# Patient Record
Sex: MALE | Race: WHITE | NOT HISPANIC OR LATINO | Employment: OTHER | URBAN - METROPOLITAN AREA
[De-identification: names, ages, dates, MRNs, and addresses within clinical notes are randomized per-mention and may not be internally consistent; named-entity substitution may affect disease eponyms.]

---

## 2017-05-26 ENCOUNTER — TRANSCRIBE ORDERS (OUTPATIENT)
Dept: ADMINISTRATIVE | Facility: HOSPITAL | Age: 57
End: 2017-05-26

## 2017-05-26 DIAGNOSIS — D48.5 NEOPLASM OF UNCERTAIN BEHAVIOR OF SKIN: Primary | ICD-10-CM

## 2017-05-30 ENCOUNTER — TRANSCRIBE ORDERS (OUTPATIENT)
Dept: ADMINISTRATIVE | Facility: HOSPITAL | Age: 57
End: 2017-05-30

## 2017-05-30 ENCOUNTER — HOSPITAL ENCOUNTER (OUTPATIENT)
Dept: MRI IMAGING | Facility: HOSPITAL | Age: 57
Discharge: HOME | End: 2017-05-30

## 2017-05-30 ENCOUNTER — HOSPITAL ENCOUNTER (OUTPATIENT)
Dept: CT IMAGING | Facility: HOSPITAL | Age: 57
Discharge: HOME | End: 2017-05-30
Admitting: DERMATOLOGY

## 2017-05-30 DIAGNOSIS — D48.5 NEOPLASM OF UNCERTAIN BEHAVIOR OF SKIN: Primary | ICD-10-CM

## 2017-05-30 DIAGNOSIS — R22.2 SWELLING, MASS, OR LUMP IN CHEST: ICD-10-CM

## 2017-05-30 DIAGNOSIS — D48.5 NEOPLASM OF UNCERTAIN BEHAVIOR OF SKIN: ICD-10-CM

## 2017-05-30 LAB — CREAT BLDA-MCNC: 0.9 MG/DL (ref 0.6–1.3)

## 2017-05-30 PROCEDURE — 74170 CT ABD WO CNTRST FLWD CNTRST: CPT

## 2017-05-30 PROCEDURE — 0 IOPAMIDOL 61 % SOLUTION: Performed by: DERMATOLOGY

## 2017-05-30 PROCEDURE — 82565 ASSAY OF CREATININE: CPT

## 2017-05-30 RX ADMIN — IOPAMIDOL 80 ML: 612 INJECTION, SOLUTION INTRAVENOUS at 17:45

## 2017-06-10 ENCOUNTER — APPOINTMENT (OUTPATIENT)
Dept: GENERAL RADIOLOGY | Facility: HOSPITAL | Age: 57
End: 2017-06-10

## 2017-06-10 ENCOUNTER — HOSPITAL ENCOUNTER (EMERGENCY)
Facility: HOSPITAL | Age: 57
Discharge: HOME | End: 2017-06-10
Attending: EMERGENCY MEDICINE | Admitting: EMERGENCY MEDICINE

## 2017-06-10 VITALS
WEIGHT: 295 LBS | OXYGEN SATURATION: 94 % | HEIGHT: 78 IN | TEMPERATURE: 98.4 F | HEART RATE: 79 BPM | RESPIRATION RATE: 18 BRPM | BODY MASS INDEX: 34.13 KG/M2 | DIASTOLIC BLOOD PRESSURE: 90 MMHG | SYSTOLIC BLOOD PRESSURE: 121 MMHG

## 2017-06-10 DIAGNOSIS — S82.832A OTHER CLOSED FRACTURE OF PROXIMAL END OF LEFT FIBULA, INITIAL ENCOUNTER: Primary | ICD-10-CM

## 2017-06-10 DIAGNOSIS — S40.011A CONTUSION OF RIGHT SHOULDER, INITIAL ENCOUNTER: ICD-10-CM

## 2017-06-10 PROCEDURE — 99284 EMERGENCY DEPT VISIT MOD MDM: CPT

## 2017-06-10 PROCEDURE — 73030 X-RAY EXAM OF SHOULDER: CPT

## 2017-06-10 PROCEDURE — 73590 X-RAY EXAM OF LOWER LEG: CPT

## 2017-06-10 RX ORDER — IBUPROFEN 600 MG/1
600 TABLET ORAL EVERY 6 HOURS PRN
COMMUNITY
End: 2022-10-14

## 2017-06-10 RX ORDER — HYDROCODONE BITARTRATE AND ACETAMINOPHEN 7.5; 325 MG/1; MG/1
1 TABLET ORAL ONCE
Status: COMPLETED | OUTPATIENT
Start: 2017-06-10 | End: 2017-06-10

## 2017-06-10 RX ORDER — HYDROCODONE BITARTRATE AND ACETAMINOPHEN 5; 325 MG/1; MG/1
1 TABLET ORAL EVERY 6 HOURS PRN
Qty: 15 TABLET | Refills: 0 | Status: SHIPPED | OUTPATIENT
Start: 2017-06-10 | End: 2022-10-14

## 2017-06-10 RX ADMIN — HYDROCODONE BITARTRATE AND ACETAMINOPHEN 1 TABLET: 7.5; 325 TABLET ORAL at 11:46

## 2017-06-10 NOTE — ED PROVIDER NOTES
Subjective   HPI Comments: Brian Bryan Jr. is a 57 y.o.male who presents to the ED with c/o left shoulder pain. The patient reports he was holding his daughters horse for her this morning, when the horse suddenly got scared and reared backwards toward the patient. The patient reports he dropped to his knees and leaned backwards to avoid the horse, however he reacted too late, and was knocked by the horse in his left shoulder and fell to the ground. He presents into the ED for evaluation. The patient c/o bilateral lower leg pain below his knees and right shoulder pain, but denies chest pain, neck pain, abdominal pain, SOA, numbness/tingling, weakness in extremities, or any other acute complaints at this time.    Patient is a 57 y.o. male presenting with upper extremity pain.   History provided by:  Patient  Upper Extremity Issue   Location:  Shoulder  Shoulder location:  R shoulder  Injury: yes    Time since incident: just PTA.  Mechanism of injury comment:  Horse accidentally knocked down the patient  Pain details:     Quality:  Aching    Radiates to:  Does not radiate    Severity:  Moderate    Onset quality:  Sudden    Timing:  Constant    Progression:  Unchanged  Foreign body present:  No foreign bodies  Prior injury to area:  No  Relieved by:  None tried  Worsened by:  Movement  Ineffective treatments:  None tried  Associated symptoms: no back pain, no fever, no neck pain, no numbness and no tingling    Risk factors: no frequent fractures        Review of Systems   Constitutional: Negative for fever.   Respiratory: Negative for shortness of breath.    Cardiovascular: Negative for chest pain.   Gastrointestinal: Negative for abdominal pain, nausea and vomiting.   Musculoskeletal: Negative for back pain and neck pain.        Right shoulder pain. Bilateral lower leg pain.   Neurological: Negative for weakness and numbness.   All other systems reviewed and are negative.      History reviewed. No pertinent past  medical history.    No Known Allergies    Past Surgical History:   Procedure Laterality Date   • HERNIA REPAIR     • TOTAL HIP ARTHROPLASTY Left        History reviewed. No pertinent family history.    Social History     Social History   • Marital status:      Spouse name: N/A   • Number of children: N/A   • Years of education: N/A     Social History Main Topics   • Smoking status: Former Smoker   • Smokeless tobacco: None   • Alcohol use No   • Drug use: No   • Sexual activity: Defer     Other Topics Concern   • None     Social History Narrative   • None         Objective   Physical Exam   Constitutional: He is oriented to person, place, and time. He appears well-developed and well-nourished. No distress.   HENT:   Head: Normocephalic and atraumatic.   Eyes: Conjunctivae are normal. No scleral icterus.   Neck: Normal range of motion. Neck supple.   Cardiovascular: Normal rate.    Pulmonary/Chest: Effort normal. No respiratory distress.   Abdominal: Soft.   Musculoskeletal: Normal range of motion. He exhibits tenderness.   Distal left lower leg tenderness. Tenderness of right humeral head, worse with ROM of right shoulder.   Neurological: He is alert and oriented to person, place, and time.   Skin: Skin is warm and dry.   Psychiatric: He has a normal mood and affect. His behavior is normal.   Nursing note and vitals reviewed.      Procedures         ED Course  ED Course   XRs show L proximal fibula fx.  Otherwise negative.  Patient stable on serial rechecks.  Discussed findings, concerns, plan of care, expected course, reasons to return and followup.  Knee immobilizer and crutches given.                    MDM  Number of Diagnoses or Management Options  Contusion of right shoulder, initial encounter:   Other closed fracture of proximal end of left fibula, initial encounter:      Amount and/or Complexity of Data Reviewed  Tests in the radiology section of CPT®: ordered and reviewed  Independent visualization  of images, tracings, or specimens: yes        Final diagnoses:   Other closed fracture of proximal end of left fibula, initial encounter   Contusion of right shoulder, initial encounter       Documentation assistance provided by whitney Escobar.  Information recorded by the scribe was done at my direction and has been verified and validated by me.     Ivette Escobar  06/10/17 1116       Ivette Escobar  06/10/17 1148       Kleber Shoemaker MD  06/10/17 0531

## 2022-10-14 ENCOUNTER — PATIENT ROUNDING (BHMG ONLY) (OUTPATIENT)
Dept: FAMILY MEDICINE CLINIC | Facility: CLINIC | Age: 62
End: 2022-10-14

## 2022-10-14 ENCOUNTER — TELEPHONE (OUTPATIENT)
Dept: FAMILY MEDICINE CLINIC | Facility: CLINIC | Age: 62
End: 2022-10-14

## 2022-10-14 ENCOUNTER — HOSPITAL ENCOUNTER (OUTPATIENT)
Dept: CARDIOLOGY | Facility: HOSPITAL | Age: 62
Discharge: HOME | End: 2022-10-14

## 2022-10-14 ENCOUNTER — OFFICE VISIT (OUTPATIENT)
Dept: CARDIOLOGY | Facility: HOSPITAL | Age: 62
End: 2022-10-14

## 2022-10-14 ENCOUNTER — OFFICE VISIT (OUTPATIENT)
Dept: FAMILY MEDICINE CLINIC | Facility: CLINIC | Age: 62
End: 2022-10-14

## 2022-10-14 VITALS
DIASTOLIC BLOOD PRESSURE: 88 MMHG | TEMPERATURE: 97 F | OXYGEN SATURATION: 93 % | SYSTOLIC BLOOD PRESSURE: 121 MMHG | RESPIRATION RATE: 20 BRPM | WEIGHT: 315 LBS | HEART RATE: 93 BPM | BODY MASS INDEX: 36.45 KG/M2 | HEIGHT: 78 IN

## 2022-10-14 VITALS
HEART RATE: 104 BPM | WEIGHT: 315 LBS | OXYGEN SATURATION: 96 % | DIASTOLIC BLOOD PRESSURE: 80 MMHG | TEMPERATURE: 97.7 F | BODY MASS INDEX: 36.45 KG/M2 | HEIGHT: 78 IN | SYSTOLIC BLOOD PRESSURE: 130 MMHG

## 2022-10-14 DIAGNOSIS — Z13.29 SCREENING FOR THYROID DISORDER: ICD-10-CM

## 2022-10-14 DIAGNOSIS — I10 PRIMARY HYPERTENSION: ICD-10-CM

## 2022-10-14 DIAGNOSIS — I48.91 NEW ONSET A-FIB: Primary | ICD-10-CM

## 2022-10-14 DIAGNOSIS — Z11.59 ENCOUNTER FOR HEPATITIS C SCREENING TEST FOR LOW RISK PATIENT: ICD-10-CM

## 2022-10-14 DIAGNOSIS — R06.83 SNORING: ICD-10-CM

## 2022-10-14 DIAGNOSIS — C44.91 BASAL CELL ADENOCARCINOMA: ICD-10-CM

## 2022-10-14 DIAGNOSIS — Z13.0 SCREENING FOR DEFICIENCY ANEMIA: ICD-10-CM

## 2022-10-14 DIAGNOSIS — E66.01 MORBID OBESITY: ICD-10-CM

## 2022-10-14 DIAGNOSIS — R01.2 ABNORMAL HEART SOUNDS: ICD-10-CM

## 2022-10-14 DIAGNOSIS — R00.2 PALPITATIONS: ICD-10-CM

## 2022-10-14 DIAGNOSIS — E66.01 MORBID OBESITY WITH BMI OF 40.0-44.9, ADULT: ICD-10-CM

## 2022-10-14 DIAGNOSIS — I48.91 ATRIAL FIBRILLATION, UNSPECIFIED TYPE: Primary | ICD-10-CM

## 2022-10-14 DIAGNOSIS — M79.89 SWELLING OF LEFT LOWER EXTREMITY: ICD-10-CM

## 2022-10-14 DIAGNOSIS — Z13.1 SCREENING FOR DIABETES MELLITUS: ICD-10-CM

## 2022-10-14 DIAGNOSIS — Z12.5 PROSTATE CANCER SCREENING: ICD-10-CM

## 2022-10-14 DIAGNOSIS — Z13.220 SCREENING FOR CHOLESTEROL LEVEL: ICD-10-CM

## 2022-10-14 DIAGNOSIS — I48.91 ATRIAL FIBRILLATION, UNSPECIFIED TYPE: ICD-10-CM

## 2022-10-14 DIAGNOSIS — M79.89 LEG SWELLING: ICD-10-CM

## 2022-10-14 DIAGNOSIS — Z12.11 SCREEN FOR COLON CANCER: ICD-10-CM

## 2022-10-14 DIAGNOSIS — R73.02 IMPAIRED GLUCOSE TOLERANCE: ICD-10-CM

## 2022-10-14 DIAGNOSIS — Z76.89 ENCOUNTER TO ESTABLISH CARE: Primary | ICD-10-CM

## 2022-10-14 PROBLEM — K46.9 HERNIA OF ABDOMINAL CAVITY: Status: ACTIVE | Noted: 2018-05-04

## 2022-10-14 LAB
QT INTERVAL: 364 MS
QTC INTERVAL: 440 MS

## 2022-10-14 PROCEDURE — 93005 ELECTROCARDIOGRAM TRACING: CPT | Performed by: NURSE PRACTITIONER

## 2022-10-14 PROCEDURE — 99204 OFFICE O/P NEW MOD 45 MIN: CPT | Performed by: NURSE PRACTITIONER

## 2022-10-14 PROCEDURE — 99204 OFFICE O/P NEW MOD 45 MIN: CPT | Performed by: PHYSICIAN ASSISTANT

## 2022-10-14 PROCEDURE — 93010 ELECTROCARDIOGRAM REPORT: CPT | Performed by: INTERNAL MEDICINE

## 2022-10-14 RX ORDER — METOPROLOL SUCCINATE 25 MG/1
25 TABLET, EXTENDED RELEASE ORAL DAILY
Qty: 30 TABLET | Refills: 1 | Status: SHIPPED | OUTPATIENT
Start: 2022-10-14 | End: 2022-11-08

## 2022-10-14 RX ORDER — HYDROCHLOROTHIAZIDE 12.5 MG/1
12.5 TABLET ORAL DAILY
COMMUNITY
End: 2022-10-14 | Stop reason: SDUPTHER

## 2022-10-14 RX ORDER — HYDROCHLOROTHIAZIDE 12.5 MG/1
12.5 TABLET ORAL DAILY
Qty: 90 TABLET | Refills: 1 | Status: ON HOLD | OUTPATIENT
Start: 2022-10-14 | End: 2022-11-10

## 2022-10-14 RX ORDER — OMEGA-3 FATTY ACIDS/FISH OIL 300-1000MG
200 CAPSULE ORAL AS NEEDED
COMMUNITY
End: 2022-10-14

## 2022-10-14 NOTE — PROGRESS NOTES
Chief Complaint   Patient presents with   • new patient preventative medicine service     Est. Care   • Labs Only     Patient would like labs done        HPI     Brian Bryan Jr. is a 62 y.o. male who presents to [facility].  His wife is my patient.  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  Currently working with a [employer] company, overseeing company/desk job.  Has gained 100 lbs in the last 3 years.  Had a physical 3 years ago with Dr. Ghosh.  Physical evaluation in 2015 in [facility] in [place].      Patient reports snoring at night.  He is unable to lay flat when sleeping due to feeling like he is smothering due to his abdominal girth.  He is wanting to lose weight.  He is willing to work on diet and exercise.  His blood pressure is borderline today.  He is taking HCTZ 12.5 mg daily.  He does have occasional leg swelling especially if his legs are not elevated.  He has some discoloration along his lower extremities as well.  He is due for colonoscopy.  Patient's father had prostate cancer.        Chief Complaint   Patient presents with   • new patient preventative medicine service     Est. Care   • Labs Only     Patient would like labs done        Past Medical History:   Diagnosis Date   • Colon polyp [id number]?/    Had a few polyps removed last colonoscopy   • Obesity     I need to lose alot of weight       Past Surgical History:   Procedure Laterality Date   • ABDOMINAL SURGERY  2018    lipoma   • HERNIA REPAIR N/A    • TOTAL HIP ARTHROPLASTY Left        Family History   Problem Relation Age of Onset   • Breast cancer Mother    • Leukemia Mother    • Prostate cancer Father    • Breast cancer Sister    • Breast cancer Paternal Aunt    • Colon cancer Neg Hx        Social History     Socioeconomic History   • Marital status:    Tobacco Use   • Smoking status: Former     Packs/day: 0.50     Years: 2.00     Pack years: 1.00     Types: Cigarettes     Start date: 1/1/1977     Quit  date: 1979     Years since quittin.8   • Smokeless tobacco: Never   • Tobacco comments:     None   Vaping Use   • Vaping Use: Never used   Substance and Sexual Activity   • Alcohol use: Yes     Alcohol/week: 4.0 standard drinks     Types: 4 Shots of liquor per week     Comment: drinks maker's kp once in awhile.   • Drug use: No   • Sexual activity: Yes     Partners: Female     Comment: Just my wife       Allergies   Allergen Reactions   • Lisinopril Cough       ROS    Review of Systems   Constitutional: Positive for fatigue and unexpected weight gain. Negative for activity change, appetite change, chills, diaphoresis, fever and unexpected weight loss.   HENT: Negative for congestion, dental problem, ear pain, hearing loss, nosebleeds, sinus pressure, sore throat and trouble swallowing.    Eyes: Negative for blurred vision, pain, redness and visual disturbance.   Respiratory: Negative for apnea, cough, chest tightness, shortness of breath and wheezing.    Cardiovascular: Positive for leg swelling. Negative for chest pain and palpitations.   Gastrointestinal: Negative for abdominal distention, abdominal pain, anal bleeding, blood in stool, constipation, diarrhea, nausea, vomiting, GERD and indigestion.   Endocrine: Negative for cold intolerance, heat intolerance, polydipsia, polyphagia and polyuria.   Genitourinary: Negative for decreased urine volume, difficulty urinating, dysuria, frequency, hematuria, urgency and urinary incontinence.   Musculoskeletal: Negative for gait problem, joint swelling and bursitis.   Skin: Positive for color change. Negative for dry skin, rash, skin lesions and wound.   Neurological: Negative for dizziness, tremors, seizures, syncope, speech difficulty, weakness, light-headedness, headache, memory problem and confusion.   Hematological: Does not bruise/bleed easily.   Psychiatric/Behavioral: Positive for sleep disturbance and stress. Negative for agitation, behavioral problems,  "decreased concentration, hallucinations, suicidal ideas and depressed mood. The patient is not nervous/anxious.        Vitals:    10/14/22 1157   BP: 130/80   Pulse: 104   Temp: 97.7 °F (36.5 °C)   SpO2: 96%   Weight: (!) 169 kg (373 lb 9.6 oz)   Height: 200.7 cm (79.02\")     Body mass index is 42.07 kg/m².    Current Outpatient Medications on File Prior to Visit   Medication Sig Dispense Refill   • [DISCONTINUED] Aspirin 81 MG capsule Take 81 mg by mouth Daily.     • [DISCONTINUED] hydroCHLOROthiazide (HYDRODIURIL) 12.5 MG tablet Take 1 tablet by mouth Daily.     • [DISCONTINUED] ibuprofen (ADVIL,MOTRIN) 600 MG tablet Take 600 mg by mouth Every 6 (Six) Hours As Needed for Mild Pain (1-3).     • [DISCONTINUED] HYDROcodone-acetaminophen (NORCO) 5-325 MG per tablet Take 1 tablet by mouth Every 6 (Six) Hours As Needed for Moderate Pain (4-6). 15 tablet 0   • [DISCONTINUED] Ibuprofen 200 MG capsule Take 200 capsules by mouth As Needed.       No current facility-administered medications on file prior to visit.       Results for orders placed or performed during the hospital encounter of 05/30/17   POC Creatinine    Specimen: Blood   Result Value Ref Range    Creatinine 0.90 0.60 - 1.30 mg/dL       ECG 12 Lead    Date/Time: 10/14/2022 1:34 PM  Performed by: Eli Mijares PA-C  Authorized by: Eli Mijares PA-C   Comparison: not compared with previous ECG   Previous ECG: no previous ECG available  Rhythm: atrial fibrillation    Clinical impression: abnormal EKG            PE  Physical Exam  Vitals reviewed.   Constitutional:       General: He is not in acute distress.     Appearance: Normal appearance. He is well-developed. He is morbidly obese. He is not ill-appearing or diaphoretic.   HENT:      Head: Normocephalic and atraumatic.   Eyes:      Extraocular Movements: Extraocular movements intact.      Conjunctiva/sclera: Conjunctivae normal.   Cardiovascular:      Rate and Rhythm: Normal rate. Rhythm " irregular.      Heart sounds: Heart sounds are distant.   Pulmonary:      Effort: Pulmonary effort is normal.      Breath sounds: Normal breath sounds.   Musculoskeletal:         General: Normal range of motion.      Cervical back: Normal range of motion.      Right lower leg: Edema present.      Left lower leg: Edema present.      Comments: Mild bilateral lower extremity edema, 1+.  Erythematous purplish discoloration of skin.   Skin:     General: Skin is warm.      Findings: No erythema or rash.   Neurological:      General: No focal deficit present.      Mental Status: He is alert.   Psychiatric:         Attention and Perception: Attention and perception normal. He is attentive.         Mood and Affect: Mood and affect normal.         Speech: Speech normal.         Behavior: Behavior normal. Behavior is cooperative.         Thought Content: Thought content normal.         Cognition and Memory: Cognition and memory normal.         Judgment: Judgment normal.         A/P    Diagnoses and all orders for this visit:    1. Encounter to establish care (Primary)    2. Morbid obesity (HCC)  -     Ambulatory Referral to Sleep Medicine    3. Snoring  -     Ambulatory Referral to Sleep Medicine    4. Abnormal heart sounds  -     ECG 12 Lead  Abnormal rhythm detected on exam today.  EKG completed which shows atrial fibrillation.  Recommend starting metoprolol 25 mg daily and Eliquis 5 mg twice daily.  Will refer patient to heart valve clinic.  Will defer treatment and changes in recommendations to their office.    5. Primary hypertension  -     hydroCHLOROthiazide (HYDRODIURIL) 12.5 MG tablet; Take 1 tablet by mouth Daily.  Dispense: 90 tablet; Refill: 1  -     ECG 12 Lead  Today.  We will check labs.  Encourage patient to check blood pressure at home.    6. Leg swelling  -     proBNP; Future    7. Impaired glucose tolerance  -     Hemoglobin A1c; Future    8. Basal cell adenocarcinoma  Recommend patient follow-up with  dermatology.    9. Screening for thyroid disorder  -     TSH Rfx On Abnormal To Free T4; Future    10. Screening for cholesterol level  -     Lipid Panel; Future    11. Screening for deficiency anemia  -     CBC Auto Differential; Future    12. Screening for diabetes mellitus  -     Comprehensive Metabolic Panel; Future    13. Prostate cancer screening  -     PSA Screen; Future    14. Screen for colon cancer  -     Ambulatory Referral For Screening Colonoscopy    15. Encounter for hepatitis C screening test for low risk patient  -     Hepatitis C Antibody; Future           Plan of care reviewed with patient at the conclusion of today's visit. Education was provided regarding diagnosis, management and any prescribed or recommended OTC medications.  Patient verbalizes understanding of and agreement with management plan.    Return in about 2 weeks (around 10/28/2022) for Annual physical.     CARMELO GalindoC

## 2022-10-14 NOTE — PROGRESS NOTES
"[facility], [facility] Heart and Vascular    Chief Complaint  Atrial Fibrillation (Newly diagnosed)    Subjective    History of Present Illness {CC  Problem List  Visit  Diagnosis   Encounters  Notes  Medications  Labs  Result Review Imaging  Media :23}     Brian Bryan Jr. presents to [facility] CARDIOLOGY for   History of Present Illness     62-year-old male presented to his primary care provider today to establish care.    Patient with a history of hypertension, morbid obesity, snoring, hx of AAA.      Pt will wake from sleep with feelings of panic. Reports wife complaints that he has sleep apnea. Poor quality of sleep with weight.     Pt drived a truck.  Has gained 100 lbs in 3 years.     During his visit an EKG was completed that stated A. Fib.     No palpitations, CP or pressure, dizziness, near syncope, syncope, dyspnea.  No change in normal physical activity tolerance     Baseline lower extremity swelling with sitting long periods.     Drinks  2-4 cups of coffee per day.  2 energy drinks per day.     ETOH 4 DRINKS PER WEEK    No family hx of CAD/MI in 1st degree relatives.     Had COVID in August.       Objective     Vital Signs:   Vitals:    10/14/22 1420 10/14/22 1422 10/14/22 1424   BP: 129/75 155/92 121/88   BP Location: Right arm Left arm Left arm   Patient Position: Sitting Standing Sitting   Cuff Size: Adult Adult Adult   Pulse: 86 83 93   Resp:   20   Temp:   97 °F (36.1 °C)   TempSrc:   Temporal   SpO2: 95% 93% 93%   Weight:   (!) 171 kg (376 lb)   Height:   200.7 cm (79.02\")     Body mass index is 42.34 kg/m².  Physical Exam  Constitutional:       General: He is not in acute distress.     Appearance: Normal appearance.   Cardiovascular:      Rate and Rhythm: Normal rate. Rhythm irregular.      Pulses:           Radial pulses are 2+ on the right side.        Dorsalis pedis pulses are 2+ on the right side.        Posterior tibial pulses are " 2+ on the right side.      Heart sounds: Normal heart sounds.   Pulmonary:      Effort: Pulmonary effort is normal.      Breath sounds: Normal breath sounds.   Musculoskeletal:      Right lower leg: Edema ( Mild bilateral lower extremity edema) present.      Left lower leg: Edema present.   Skin:     General: Skin is warm and dry.      Comments: Discoloration of bilateral lower extremities   Neurological:      Mental Status: He is alert.   Psychiatric:         Mood and Affect: Mood normal.         Behavior: Behavior is cooperative.              Result Review  Data Reviewed:{ Labs  Result Review  Imaging  Med Tab  Media :23}                 Assessment and Plan {CC Problem List  Visit Diagnosis  ROS  Review (Popup)  Health Maintenance  Quality  BestPractice  Medications  SmartSets  SnapShot Encounters  Media :23}   1. New onset a-fib (HCC)    - ECG 12 Lead; afib 88 bpm    Duration unknown.    Appears to be asymptomatic    Continue metoprolol succinate 25 mg daily    CHADS-VASc Risk Assessment            2 Total Score    1 Hypertension    1 Age 65-74        Criteria that do not apply:    CHF    Age >/= 75    DM    PRIOR STROKE/TIA/THROMBO    Vascular Disease    Sex: Female        Continue Eliquis 5 mg BID  A. fib education completed: What is atrial fibrillation, causes, triggers, signs and symptoms, medication management (rate control versus rhythm control) and stroke prevention, procedural management and indications, and the role of the atrial fibrillation center and when to call.      - Adult Transthoracic Echo Complete W/ Cont if Necessary Per Protocol; Future    Labs to be completed today been ordered by primary care provider    Decrease caffeine intake to 1-2 caffeinated beverages per day.    Limit EtOH, drinks sparingly no more than 2 alcoholic beverages per setting    Ultrasound bed change  2. Primary hypertension  Blood pressure controlled today  Continue metoprolol, HCTZ    3. Morbid obesity  with BMI of 40.0-44.9, adult (HCC)  Body mass index is 42.34 kg/m².  Encouraged diet and exercise for weight loss    4. Snoring  Sleep study needed.  PCP completed referral    5. Edema  HCTZ.    F/u with [facility] to scheduled.  Follow-up in the heart valve center as needed or as determined by cardiology.        Follow Up {Instructions Charge Capture  Follow-up Communications :23}   Return if symptoms worsen or fail to improve.    Patient was given instructions and counseling regarding his condition or for health maintenance advice. Please see specific information pulled into the AVS if appropriate.  Patient was instructed to call the Heart and Valve Center with any questions, concerns, or worsening symptoms.

## 2022-10-15 DIAGNOSIS — E78.2 MIXED HYPERLIPIDEMIA: Primary | ICD-10-CM

## 2022-10-15 RX ORDER — ATORVASTATIN CALCIUM 20 MG/1
20 TABLET, FILM COATED ORAL NIGHTLY
Qty: 30 TABLET | Refills: 5 | Status: SHIPPED | OUTPATIENT
Start: 2022-10-15 | End: 2022-11-08 | Stop reason: SDUPTHER

## 2022-10-17 ENCOUNTER — HOSPITAL ENCOUNTER (OUTPATIENT)
Dept: CARDIOLOGY | Facility: HOSPITAL | Age: 62
Discharge: HOME | End: 2022-10-17
Admitting: NURSE PRACTITIONER

## 2022-10-17 ENCOUNTER — TELEPHONE (OUTPATIENT)
Dept: FAMILY MEDICINE CLINIC | Facility: CLINIC | Age: 62
End: 2022-10-17

## 2022-10-17 VITALS — HEIGHT: 78 IN | BODY MASS INDEX: 36.45 KG/M2 | WEIGHT: 315 LBS

## 2022-10-17 DIAGNOSIS — I48.91 NEW ONSET A-FIB: ICD-10-CM

## 2022-10-17 LAB
ASCENDING AORTA: 4.3 CM
BH CV ECHO MEAS - AO MAX PG: 8.2 MMHG
BH CV ECHO MEAS - AO MEAN PG: 5 MMHG
BH CV ECHO MEAS - AO ROOT DIAM: 4.4 CM
BH CV ECHO MEAS - AO V2 MAX: 143 CM/SEC
BH CV ECHO MEAS - AO V2 VTI: 22.9 CM
BH CV ECHO MEAS - EDV(CUBED): 129.6 ML
BH CV ECHO MEAS - EDV(MOD-SP2): 71 ML
BH CV ECHO MEAS - EDV(MOD-SP4): 105 ML
BH CV ECHO MEAS - EF(MOD-BP): 53 %
BH CV ECHO MEAS - EF(MOD-SP2): 56.3 %
BH CV ECHO MEAS - EF(MOD-SP4): 51.4 %
BH CV ECHO MEAS - ESV(CUBED): 47.8 ML
BH CV ECHO MEAS - ESV(MOD-SP2): 31 ML
BH CV ECHO MEAS - ESV(MOD-SP4): 51 ML
BH CV ECHO MEAS - FS: 28.3 %
BH CV ECHO MEAS - IVS/LVPW: 1.02 CM
BH CV ECHO MEAS - IVSD: 1.42 CM
BH CV ECHO MEAS - LA DIMENSION: 3.5 CM
BH CV ECHO MEAS - LAT PEAK E' VEL: 8.4 CM/SEC
BH CV ECHO MEAS - LV MASS(C)D: 298.4 GRAMS
BH CV ECHO MEAS - LV MAX PG: 5.9 MMHG
BH CV ECHO MEAS - LV MEAN PG: 3 MMHG
BH CV ECHO MEAS - LV V1 MAX: 121 CM/SEC
BH CV ECHO MEAS - LV V1 VTI: 20.6 CM
BH CV ECHO MEAS - LVIDD: 5.1 CM
BH CV ECHO MEAS - LVIDS: 3.6 CM
BH CV ECHO MEAS - LVPWD: 1.39 CM
BH CV ECHO MEAS - MED PEAK E' VEL: 7.5 CM/SEC
BH CV ECHO MEAS - MV DEC SLOPE: 413 CM/SEC2
BH CV ECHO MEAS - MV DEC TIME: 0.26 MSEC
BH CV ECHO MEAS - MV E MAX VEL: 101 CM/SEC
BH CV ECHO MEAS - MV P1/2T: 84.4 MSEC
BH CV ECHO MEAS - MVA(P1/2T): 2.6 CM2
BH CV ECHO MEAS - SV(MOD-SP2): 40 ML
BH CV ECHO MEAS - SV(MOD-SP4): 54 ML
BH CV ECHO MEAS - TAPSE (>1.6): 1.9 CM
BH CV ECHO MEASUREMENTS AVERAGE E/E' RATIO: 12.7
BH CV XLRA - RV BASE: 3.2 CM
BH CV XLRA - RV LENGTH: 7.7 CM
BH CV XLRA - RV MID: 2.4 CM
BH CV XLRA - TDI S': 14.1 CM/SEC
IVRT: 95 MSEC
LEFT ATRIUM VOLUME INDEX: 31.9 ML/M2
LV EF 2D ECHO EST: 50 %
MAXIMAL PREDICTED HEART RATE: 156 BPM
STRESS TARGET HR: 133 BPM

## 2022-10-17 PROCEDURE — 93306 TTE W/DOPPLER COMPLETE: CPT | Performed by: INTERNAL MEDICINE

## 2022-10-17 PROCEDURE — 93306 TTE W/DOPPLER COMPLETE: CPT

## 2022-10-17 NOTE — TELEPHONE ENCOUNTER
Attempted to contact, call cannot be completed at this time.     Hub may relay message & document.     PCP reviewed labs and sent pt a letter and message on [employer] on 10/15/2022. System shows that the message was reviewed. Within that letter it also explains why he would need to start the Atorvastatin. If he has additional questions please let us know or he can send a message via [employer] with questions/concerns for PCP.

## 2022-10-17 NOTE — TELEPHONE ENCOUNTER
Caller: Brian Bryan Jr.    Relationship: Self    Best call back number:    [phone]     What is the best time to reach you:     ANY TIME    Who are you requesting to speak with (clinical staff, provider,  specific staff member):     BRANDEN MULLER    What was the call regarding:     PATIENT REQUESTED A CALL BACK FROM BRANDEN MULLER WITH EXPLANATION AS TO WHY LIPITOR WAS PRESCRIBED WITHOUT ALERTING PATIENT FIRST TO GO OVER THE LAB RESULTS    PLEASE CONTACT PATIENT TO REVIEW OTHER RESULTS FROM LAB WORK AS WELL    Do you require a callback:     YES

## 2022-10-17 NOTE — TELEPHONE ENCOUNTER
Spoke with patient to discuss his labs results in length. He verbalized understanding and agreed to follow medical advice

## 2022-10-18 NOTE — PROGRESS NOTES
I have reviewed your echocardiogram results.  Your heart muscle strength is normal.  No concerning valvular heart disease.  There is mild to moderate thickness in the left ventricle.  We will focus on good blood pressure control.  Overall your results are considered acceptable.

## 2022-10-21 ENCOUNTER — TELEPHONE (OUTPATIENT)
Dept: FAMILY MEDICINE CLINIC | Facility: CLINIC | Age: 62
End: 2022-10-21

## 2022-10-21 NOTE — TELEPHONE ENCOUNTER
Caller: Brian Bryan Jr.    Relationship: Self    Best call back number: [phone]    What is the best time to reach you: ANYTIME     Who are you requesting to speak with (clinical staff, provider,  specific staff member): CLINICAL STAFF     Do you know the name of the person who called: THE PATIENT MANNIE    What was the call regarding: THE PATIENT MANNIE REPORTS THAT SINCE BRANDEN STARTED HIM ON THE NEWEST THREE MEDICATIONS LAST WEEK, HE WAKES UP ABOUT 3:00 AM WITH A SUBSTANTIAL HEADACHE (NOT A MIGRAINE LEVEL) AND IS REQUESTING A CALL BACK TO DISCUSS     Do you require a callback: YES

## 2022-10-21 NOTE — TELEPHONE ENCOUNTER
Patient returned call to discuss further, he states that he has cut back on his caffeine, not drinking energy drinks during the day     He began experiencing worsening headaches after his consultation with helga womack. I advised him to monitor his BP while he is home and if his headaches continue to worsen to notify us before his upcoming appt    He reports that overall he is much improved, less anxious, making lifestyle changes and dieting.

## 2022-10-21 NOTE — TELEPHONE ENCOUNTER
Spoke with patient about headaches.  He has stopped most of his caffeine and he was consuming multiple energy drinks a day plus coffee.  Now only drinking 2 cups of coffee.  His headache onset also coincides with starting atorvastatin.  He will stop atorvastatin for now.  Has follow-up with our office on 10/28 and we will discuss further at that time.  Headaches due resolve with tylenol.

## 2022-11-01 NOTE — PROGRESS NOTES
Sleep Clinic Video Visit Consult Note    You have chosen to receive care through a telehealth visit.  Do you consent to use a video/audio connection for your medical care today? Yes     Chief Complaint  Sleep problem    Subjective   2  History of Present Illness:  Brian Bryan Jr. is a 62 y.o. male with a history afib (on eliqius), of obesity.  The patient was recently seen by primary care noting that he had gained 100 pounds in the last 3 years.  He reports snoring and an inability to lie flat due to feeling that he is smothering. 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 Since this he has had worse snoring. He had an inguinal hernia surgery and developed a fatty tumor and had this removed in 2016. After this he lost weight and felt better but got back on the road and gained. Weight. He has started medication and is now working on weight loss. He lost about 15 lbs in 2 weeks. He is to see cardiology in 2 weeks for evaluation of Afib and cardioversion. He is on eliquis, metoprolol, and hydrochlorothiazide.This started after having Covid recently.    Further details are as follows:    Fullerton Scale is (out of 24): Total score: 7     Estimated average amount of sleep per night: 7.5  Number of times he wakes up at night: 6  Difficulty falling back asleep: no  It usually takes 5 minutes to go to sleep.  He feels sleepy upon waking up: yes  Rotating or night shift work: no    Drowsiness/Sleepiness:  He exhibits the following:  excessive daytime sleepiness  excessive daytime fatigue  falls asleep watching TV    Snoring/Breathing:  He exhibits the following:  loud snoring  snores in all sleep positions  awoken with dry mouth  quits breathing at night  awakens gasping for breath    Head Injury:  He exhibits the following:  No    Reflux:  He describes the following:  None    Narcolepsy:  He exhibits the following:  none    RLS/PLMs:  He describes  "the following:  none    Insomnia:  He describes the following:  frequent awakenings  restless sleep    Parasomnia:  He exhibits the following:  sleep walks  None    Weight:  Weight change in the last year:  loss: 15 lbs    The patient's relevant past medical, surgical, family, and social history reviewed and updated in Epic as appropriate.    Review of Systems    Objective   Vital Signs:  Ht 203.2 cm (80\")   Wt (!) 171 kg (376 lb)   BMI 41.31 kg/m²     Class 3 Severe Obesity (BMI >=40). Obesity-related health conditions include the following: obstructive sleep apnea. Obesity is improving with lifestyle modifications. BMI is is above average; BMI management plan is completed. We discussed portion control and increasing exercise.        Physical Exam  Constitutional:       Appearance: Normal appearance.   Neurological:      General: No focal deficit present.      Mental Status: He is alert and oriented to person, place, and time.   Psychiatric:         Mood and Affect: Mood normal.         Behavior: Behavior normal.             Result Review :              Assessment and Plan  This is a very pleasant 62-year-old male who presents for evaluation of snoring, excessive daytime sleepiness fatigue, nonrestorative sleep, and concerns for sleep disordered breathing obstructive sleep apnea.  I suspect there is a component of obesity hypoventilation syndrome.  We will obtain a PSG for further evaluation given his new onset afib.  Patient will return for follow-up after test for recommendations.  I discussed weight loss as it pertains to obstructive sleep apnea. He is very motivated to lose weight and has lost 15 lbs in just these past few weeks with Álvarez and low carb diet. He is to see cardiology in a few weeks for discussion of cardioversion or possible ablation.     Diagnoses and all orders for this visit:    1. Snoring (Primary)  -     Polysomnography 4 or More Parameters; Future    2. Suspected sleep apnea  -     " Polysomnography 4 or More Parameters; Future    3. Excessive daytime sleepiness  -     Polysomnography 4 or More Parameters; Future    4. Chronic atrial fibrillation (HCC)  -     Polysomnography 4 or More Parameters; Future    5. Class 3 severe obesity in adult, unspecified BMI, unspecified obesity type, unspecified whether serious comorbidity present (HCC)                 I discussed the consequences of uncontrolled sleep apnea including hypertension, heart disease, diabetes, stroke, and dementia. I further discussed sleep apnea therapeutic options including CPAP, Weight loss, Oral dental appliance, and surgery.    Follow Up  Return for Follow up after study.  Patient was given instructions and counseling regarding his condition or for health maintenance advice. Please see specific information pulled into the AVS if appropriate.     HAKAN Edmond, [facility]  Pulmonary, Critical Care Medicine, and Sleep Medicine  Electronically signed by HAKAN Manjarrez, 11/01/22, 10:04 AM EDT.

## 2022-11-02 ENCOUNTER — TELEMEDICINE (OUTPATIENT)
Dept: SLEEP MEDICINE | Facility: HOSPITAL | Age: 62
End: 2022-11-02

## 2022-11-02 VITALS — WEIGHT: 315 LBS | HEIGHT: 78 IN | BODY MASS INDEX: 36.45 KG/M2

## 2022-11-02 DIAGNOSIS — R29.818 SUSPECTED SLEEP APNEA: ICD-10-CM

## 2022-11-02 DIAGNOSIS — G47.19 EXCESSIVE DAYTIME SLEEPINESS: ICD-10-CM

## 2022-11-02 DIAGNOSIS — I48.20 CHRONIC ATRIAL FIBRILLATION: ICD-10-CM

## 2022-11-02 DIAGNOSIS — E66.01 CLASS 3 SEVERE OBESITY IN ADULT, UNSPECIFIED BMI, UNSPECIFIED OBESITY TYPE, UNSPECIFIED WHETHER SERIOUS COMORBIDITY PRESENT: ICD-10-CM

## 2022-11-02 DIAGNOSIS — R06.83 SNORING: Primary | ICD-10-CM

## 2022-11-02 PROCEDURE — 99213 OFFICE O/P EST LOW 20 MIN: CPT | Performed by: NURSE PRACTITIONER

## 2022-11-08 ENCOUNTER — PREP FOR SURGERY (OUTPATIENT)
Dept: OTHER | Facility: HOSPITAL | Age: 62
End: 2022-11-08

## 2022-11-08 ENCOUNTER — OFFICE VISIT (OUTPATIENT)
Dept: CARDIOLOGY | Facility: CLINIC | Age: 62
End: 2022-11-08

## 2022-11-08 VITALS
OXYGEN SATURATION: 95 % | BODY MASS INDEX: 36.45 KG/M2 | WEIGHT: 315 LBS | HEIGHT: 78 IN | DIASTOLIC BLOOD PRESSURE: 82 MMHG | SYSTOLIC BLOOD PRESSURE: 126 MMHG | HEART RATE: 83 BPM

## 2022-11-08 DIAGNOSIS — I48.19 PERSISTENT ATRIAL FIBRILLATION: ICD-10-CM

## 2022-11-08 DIAGNOSIS — R06.83 SNORING: Primary | ICD-10-CM

## 2022-11-08 DIAGNOSIS — I10 PRIMARY HYPERTENSION: ICD-10-CM

## 2022-11-08 DIAGNOSIS — E78.2 MIXED HYPERLIPIDEMIA: ICD-10-CM

## 2022-11-08 DIAGNOSIS — I48.19 ATRIAL FIBRILLATION, PERSISTENT: Primary | ICD-10-CM

## 2022-11-08 PROCEDURE — 99204 OFFICE O/P NEW MOD 45 MIN: CPT | Performed by: INTERNAL MEDICINE

## 2022-11-08 RX ORDER — FLUMAZENIL 0.1 MG/ML
0.5 INJECTION INTRAVENOUS ONCE AS NEEDED
Status: CANCELLED | OUTPATIENT
Start: 2022-11-08

## 2022-11-08 RX ORDER — NALOXONE HCL 0.4 MG/ML
0.4 VIAL (ML) INJECTION ONCE AS NEEDED
Status: CANCELLED | OUTPATIENT
Start: 2022-11-08

## 2022-11-08 RX ORDER — SODIUM CHLORIDE 0.9 % (FLUSH) 0.9 %
10 SYRINGE (ML) INJECTION AS NEEDED
Status: CANCELLED | OUTPATIENT
Start: 2022-11-08

## 2022-11-08 RX ORDER — ATORVASTATIN CALCIUM 20 MG/1
20 TABLET, FILM COATED ORAL NIGHTLY
Qty: 90 TABLET | Refills: 3 | Status: SHIPPED | OUTPATIENT
Start: 2022-11-08 | End: 2023-03-27 | Stop reason: SDUPTHER

## 2022-11-08 RX ORDER — SODIUM CHLORIDE 0.9 % (FLUSH) 0.9 %
10 SYRINGE (ML) INJECTION EVERY 12 HOURS SCHEDULED
Status: CANCELLED | OUTPATIENT
Start: 2022-11-08

## 2022-11-08 RX ORDER — MIDAZOLAM HYDROCHLORIDE 1 MG/ML
2-8 INJECTION INTRAMUSCULAR; INTRAVENOUS ONCE AS NEEDED
Status: CANCELLED | OUTPATIENT
Start: 2022-11-08

## 2022-11-08 RX ORDER — HYDROCHLOROTHIAZIDE 25 MG/1
TABLET ORAL
COMMUNITY
Start: 2022-10-26 | End: 2022-11-08 | Stop reason: SDUPTHER

## 2022-11-08 RX ORDER — SOTALOL HYDROCHLORIDE 80 MG/1
80 TABLET ORAL 2 TIMES DAILY
Qty: 180 TABLET | Refills: 1 | Status: SHIPPED | OUTPATIENT
Start: 2022-11-08 | End: 2022-11-17

## 2022-11-08 RX ORDER — FENTANYL CITRATE 50 UG/ML
50-100 INJECTION, SOLUTION INTRAMUSCULAR; INTRAVENOUS ONCE AS NEEDED
Status: CANCELLED | OUTPATIENT
Start: 2022-11-08

## 2022-11-08 RX ORDER — SODIUM CHLORIDE 9 MG/ML
20 INJECTION, SOLUTION INTRAVENOUS CONTINUOUS
Status: CANCELLED | OUTPATIENT
Start: 2022-11-08

## 2022-11-08 NOTE — H&P (VIEW-ONLY)
[facility]  Cardiology Consultation Note     Brian Bryan Jr.  1960  Requesting Provider: HAKAN May  PCP: Eli Mijares PA-C    ID:  Brian Bryan Jr. is a 62 y.o. male who resides in [place], [place]. patient previously in horse industry.    REASON FOR CONSULTATION:    • New-onset atrial fibrillation         Dear Tess Aguilar:    Thank you for referring Mina Bryan to my office today for atrial fibrillation.  The patient is a 62-year-old gentleman who was noted to have atrial fibrillation during a PCP visit in October.  He was asymptomatic and rate controlled at the time.  He was started on metoprolol and apixaban referred to you.  Echocardiogram has been performed which was unremarkable.  The last time that he had an EKG was during a life insurance physical last year.  He states that nothing was said about his heart rhythm at that time.    Patient admits to several years of heavy drinking.  He recently has stopped drinking and has been attempting to lose weight.  In the last 2 weeks since getting this diagnosis he has lost 17 pounds.  He is being evaluated for sleep apnea with sleep study in the near future.    Patient expresses desire at attempted cardioversion.  He has been on apixaban now for 3 weeks.      Past Medical History, Past Surgical History, Family history, Social History, and Medications were all reviewed with the patient today and updated as necessary.       Current Outpatient Medications:   •  apixaban (ELIQUIS) 5 MG tablet tablet, Take 1 tablet by mouth 2 (Two) Times a Day., Disp: 180 tablet, Rfl: 3  •  atorvastatin (LIPITOR) 20 MG tablet, Take 1 tablet by mouth Every Night., Disp: 90 tablet, Rfl: 3  •  hydroCHLOROthiazide (HYDRODIURIL) 12.5 MG tablet, Take 1 tablet by mouth Daily., Disp: 90 tablet, Rfl: 1  •  sotalol (Betapace) 80 MG tablet, Take 1 tablet by mouth 2 (Two) Times a Day., Disp: 180 tablet, Rfl:  "1    Allergies   Allergen Reactions   • Lisinopril Cough         Past Medical History:   Diagnosis Date   • A-fib (HCC)    • Colon polyp [id number]?/    Had a few polyps removed last colonoscopy   • Obesity     I need to lose alot of weight       Past Surgical History:   Procedure Laterality Date   • ABDOMINAL SURGERY  2018    lipoma   • HERNIA REPAIR N/A    • TOTAL HIP ARTHROPLASTY Left        Family History   Problem Relation Age of Onset   • Breast cancer Mother    • Leukemia Mother    • Prostate cancer Father    • Breast cancer Sister    • Breast cancer Paternal Aunt    • Colon cancer Neg Hx        Social History     Tobacco Use   • Smoking status: Former     Packs/day: 0.50     Years: 2.00     Pack years: 1.00     Types: Cigarettes     Start date: 1977     Quit date: 1979     Years since quittin.8   • Smokeless tobacco: Never   • Tobacco comments:     None   Substance Use Topics   • Alcohol use: Yes     Alcohol/week: 4.0 standard drinks     Types: 4 Shots of liquor per week     Comment: drinks maker's kp once in awhile.       Review of Systems   Constitutional: Negative for malaise/fatigue.   Eyes: Negative for vision loss in left eye and vision loss in right eye.   Cardiovascular: Negative for chest pain, dyspnea on exertion, near-syncope, orthopnea, palpitations, paroxysmal nocturnal dyspnea and syncope.   Musculoskeletal: Negative for myalgias.   Neurological: Negative for brief paralysis, excessive daytime sleepiness, focal weakness, numbness, paresthesias and weakness.   All other systems reviewed and are negative.              /82 (BP Location: Left arm, Patient Position: Sitting)   Pulse 83   Ht 203.2 cm (80\")   Wt (!) 165 kg (364 lb 9.6 oz)   SpO2 95%   BMI 40.05 kg/m²        Constitutional:       Appearance: Healthy appearance. Morbidly obese.   Eyes:      General: Lids are normal. No scleral icterus.     Conjunctiva/sclera: Conjunctivae normal.   HENT:      Head: Normocephalic " and atraumatic.   Neck:      Thyroid: No thyroid mass or thyromegaly.      Vascular: No carotid bruit or JVD. JVD normal.   Pulmonary:      Effort: Pulmonary effort is normal.      Breath sounds: Normal breath sounds. No wheezing. No rhonchi. No rales.   Cardiovascular:      Normal rate. Irregularly irregular rhythm.      Murmurs: There is no murmur.      No gallop. No rub.   Pulses:     Intact distal pulses.   Edema:     Pretibial: bilateral 2+ edema of the pretibial area.  Abdominal:      General: There is no distension.      Palpations: Abdomen is soft. There is no abdominal mass.   Musculoskeletal:      Extremities: No clubbing present.     Cervical back: Normal range of motion. Skin:     General: Skin is warm and dry. There is no cyanosis.      Findings: No rash.   Neurological:      General: No focal deficit present.      Mental Status: Alert and oriented to person, place, and time.      Gait: Gait is intact.   Psychiatric:         Attention and Perception: Attention normal.         Mood and Affect: Mood normal.         Behavior: Behavior normal. Behavior is cooperative.           EKG (10/14/2022): Atrial fibrillation at 88 bpm.      Lab Results   Component Value Date    CHOL 205 (H) 10/14/2022    HDL 46 10/14/2022     (H) 10/14/2022    VLDL 23 10/14/2022     Lab Results   Component Value Date    GLUCOSE 95 10/14/2022    BUN 16 10/14/2022    CREATININE 0.86 10/14/2022    BCR 18.6 10/14/2022    K 4.4 10/14/2022    CO2 33.3 (H) 10/14/2022    CALCIUM 9.8 10/14/2022    ALBUMIN 4.50 10/14/2022    AST 39 10/14/2022    ALT 67 (H) 10/14/2022     Lab Results   Component Value Date    WBC 5.09 10/14/2022    HGB 18.5 (H) 10/14/2022    HCT 55.9 (H) 10/14/2022    MCV 92.4 10/14/2022     10/14/2022              Problem List Items Addressed This Visit        Cardiology Problems    Mixed hyperlipidemia    Current Assessment & Plan     · Recommend atorvastatin for primary prevention         Relevant Medications     atorvastatin (LIPITOR) 20 MG tablet    Persistent atrial fibrillation (HCC)    Overview     · Diagnosed incidentally at PCP office visit, 10/2022  · Echo (10/17/2022): LVEF 50%.  No significant valve abnormality         Current Assessment & Plan     · Risk factors for developing A. fib include age, alcohol use, and likely obstructive sleep apnea  · Presently asymptomatic for palpitations and previous TIA or stroke symptoms  · Will attempt rhythm control strategy  · Start sotalol 80 mg twice daily  · Continue apixaban  · ECV on 11/10/2022         Relevant Medications    apixaban (ELIQUIS) 5 MG tablet tablet    sotalol (Betapace) 80 MG tablet    Other Relevant Orders    Cardioversion External in Cardiology Department    Primary hypertension    Overview     • Target blood pressure <130/80 mmHg         Current Assessment & Plan     · Controlled  · Continue thiazide  · Changing metoprolol to sotalol         Relevant Medications    sotalol (Betapace) 80 MG tablet       Other    Snoring - Primary                · Stop metoprolol  · Start sotalol 80 mg twice daily  · External cardioversion 11/10/2022  · Proceed with sleep evaluation          H. CEliud Alford MD, FACC, [facility]  Interventional Cardiology  11/08/22  18:34 EST

## 2022-11-08 NOTE — ASSESSMENT & PLAN NOTE
· Risk factors for developing A. fib include age, alcohol use, and likely obstructive sleep apnea  · Presently asymptomatic for palpitations and previous TIA or stroke symptoms  · Will attempt rhythm control strategy  · Start sotalol 80 mg twice daily  · Continue apixaban  · ECV on 11/10/2022

## 2022-11-08 NOTE — PROGRESS NOTES
[facility] at [facility]  Cardiology Consultation Note     Brian Bryan Jr.  1960  Requesting Provider: HAKAN May  PCP: Eli Mijares PA-C    ID:  Brian Bryan Jr. is a 62 y.o. male who resides in [place], [place]. patient previously in horse industry.    REASON FOR CONSULTATION:    • New-onset atrial fibrillation         Dear Tess Aguilar:    Thank you for referring Mina Bryan to my office today for atrial fibrillation.  The patient is a 62-year-old gentleman who was noted to have atrial fibrillation during a PCP visit in October.  He was asymptomatic and rate controlled at the time.  He was started on metoprolol and apixaban referred to you.  Echocardiogram has been performed which was unremarkable.  The last time that he had an EKG was during a life insurance physical last year.  He states that nothing was said about his heart rhythm at that time.    Patient admits to several years of heavy drinking.  He recently has stopped drinking and has been attempting to lose weight.  In the last 2 weeks since getting this diagnosis he has lost 17 pounds.  He is being evaluated for sleep apnea with sleep study in the near future.    Patient expresses desire at attempted cardioversion.  He has been on apixaban now for 3 weeks.      Past Medical History, Past Surgical History, Family history, Social History, and Medications were all reviewed with the patient today and updated as necessary.       Current Outpatient Medications:   •  apixaban (ELIQUIS) 5 MG tablet tablet, Take 1 tablet by mouth 2 (Two) Times a Day., Disp: 180 tablet, Rfl: 3  •  atorvastatin (LIPITOR) 20 MG tablet, Take 1 tablet by mouth Every Night., Disp: 90 tablet, Rfl: 3  •  hydroCHLOROthiazide (HYDRODIURIL) 12.5 MG tablet, Take 1 tablet by mouth Daily., Disp: 90 tablet, Rfl: 1  •  sotalol (Betapace) 80 MG tablet, Take 1 tablet by mouth 2 (Two) Times a Day., Disp: 180 tablet, Rfl:  "1    Allergies   Allergen Reactions   • Lisinopril Cough         Past Medical History:   Diagnosis Date   • A-fib (HCC)    • Colon polyp [id number]?/    Had a few polyps removed last colonoscopy   • Obesity     I need to lose alot of weight       Past Surgical History:   Procedure Laterality Date   • ABDOMINAL SURGERY  2018    lipoma   • HERNIA REPAIR N/A    • TOTAL HIP ARTHROPLASTY Left        Family History   Problem Relation Age of Onset   • Breast cancer Mother    • Leukemia Mother    • Prostate cancer Father    • Breast cancer Sister    • Breast cancer Paternal Aunt    • Colon cancer Neg Hx        Social History     Tobacco Use   • Smoking status: Former     Packs/day: 0.50     Years: 2.00     Pack years: 1.00     Types: Cigarettes     Start date: 1977     Quit date: 1979     Years since quittin.8   • Smokeless tobacco: Never   • Tobacco comments:     None   Substance Use Topics   • Alcohol use: Yes     Alcohol/week: 4.0 standard drinks     Types: 4 Shots of liquor per week     Comment: drinks maker's kp once in awhile.       Review of Systems   Constitutional: Negative for malaise/fatigue.   Eyes: Negative for vision loss in left eye and vision loss in right eye.   Cardiovascular: Negative for chest pain, dyspnea on exertion, near-syncope, orthopnea, palpitations, paroxysmal nocturnal dyspnea and syncope.   Musculoskeletal: Negative for myalgias.   Neurological: Negative for brief paralysis, excessive daytime sleepiness, focal weakness, numbness, paresthesias and weakness.   All other systems reviewed and are negative.              /82 (BP Location: Left arm, Patient Position: Sitting)   Pulse 83   Ht 203.2 cm (80\")   Wt (!) 165 kg (364 lb 9.6 oz)   SpO2 95%   BMI 40.05 kg/m²        Constitutional:       Appearance: Healthy appearance. Morbidly obese.   Eyes:      General: Lids are normal. No scleral icterus.     Conjunctiva/sclera: Conjunctivae normal.   HENT:      Head: Normocephalic " and atraumatic.   Neck:      Thyroid: No thyroid mass or thyromegaly.      Vascular: No carotid bruit or JVD. JVD normal.   Pulmonary:      Effort: Pulmonary effort is normal.      Breath sounds: Normal breath sounds. No wheezing. No rhonchi. No rales.   Cardiovascular:      Normal rate. Irregularly irregular rhythm.      Murmurs: There is no murmur.      No gallop. No rub.   Pulses:     Intact distal pulses.   Edema:     Pretibial: bilateral 2+ edema of the pretibial area.  Abdominal:      General: There is no distension.      Palpations: Abdomen is soft. There is no abdominal mass.   Musculoskeletal:      Extremities: No clubbing present.     Cervical back: Normal range of motion. Skin:     General: Skin is warm and dry. There is no cyanosis.      Findings: No rash.   Neurological:      General: No focal deficit present.      Mental Status: Alert and oriented to person, place, and time.      Gait: Gait is intact.   Psychiatric:         Attention and Perception: Attention normal.         Mood and Affect: Mood normal.         Behavior: Behavior normal. Behavior is cooperative.           EKG (10/14/2022): Atrial fibrillation at 88 bpm.      Lab Results   Component Value Date    CHOL 205 (H) 10/14/2022    HDL 46 10/14/2022     (H) 10/14/2022    VLDL 23 10/14/2022     Lab Results   Component Value Date    GLUCOSE 95 10/14/2022    BUN 16 10/14/2022    CREATININE 0.86 10/14/2022    BCR 18.6 10/14/2022    K 4.4 10/14/2022    CO2 33.3 (H) 10/14/2022    CALCIUM 9.8 10/14/2022    ALBUMIN 4.50 10/14/2022    AST 39 10/14/2022    ALT 67 (H) 10/14/2022     Lab Results   Component Value Date    WBC 5.09 10/14/2022    HGB 18.5 (H) 10/14/2022    HCT 55.9 (H) 10/14/2022    MCV 92.4 10/14/2022     10/14/2022              Problem List Items Addressed This Visit        Cardiology Problems    Mixed hyperlipidemia    Current Assessment & Plan     · Recommend atorvastatin for primary prevention         Relevant Medications     atorvastatin (LIPITOR) 20 MG tablet    Persistent atrial fibrillation (HCC)    Overview     · Diagnosed incidentally at PCP office visit, 10/2022  · Echo (10/17/2022): LVEF 50%.  No significant valve abnormality         Current Assessment & Plan     · Risk factors for developing A. fib include age, alcohol use, and likely obstructive sleep apnea  · Presently asymptomatic for palpitations and previous TIA or stroke symptoms  · Will attempt rhythm control strategy  · Start sotalol 80 mg twice daily  · Continue apixaban  · ECV on 11/10/2022         Relevant Medications    apixaban (ELIQUIS) 5 MG tablet tablet    sotalol (Betapace) 80 MG tablet    Other Relevant Orders    Cardioversion External in Cardiology Department    Primary hypertension    Overview     • Target blood pressure <130/80 mmHg         Current Assessment & Plan     · Controlled  · Continue thiazide  · Changing metoprolol to sotalol         Relevant Medications    sotalol (Betapace) 80 MG tablet       Other    Snoring - Primary                · Stop metoprolol  · Start sotalol 80 mg twice daily  · External cardioversion 11/10/2022  · Proceed with sleep evaluation          H. CEliud Alford MD, FACC, [facility]  Interventional Cardiology  11/08/22  18:34 EST

## 2022-11-10 ENCOUNTER — HOSPITAL ENCOUNTER (OUTPATIENT)
Dept: CARDIOLOGY | Facility: HOSPITAL | Age: 62
Discharge: HOME | End: 2022-11-10
Attending: INTERNAL MEDICINE | Admitting: INTERNAL MEDICINE

## 2022-11-10 ENCOUNTER — OFFICE VISIT (OUTPATIENT)
Dept: FAMILY MEDICINE CLINIC | Facility: CLINIC | Age: 62
End: 2022-11-10

## 2022-11-10 VITALS
DIASTOLIC BLOOD PRESSURE: 94 MMHG | HEART RATE: 57 BPM | HEIGHT: 78 IN | RESPIRATION RATE: 16 BRPM | TEMPERATURE: 97.2 F | OXYGEN SATURATION: 94 % | BODY MASS INDEX: 36.45 KG/M2 | SYSTOLIC BLOOD PRESSURE: 118 MMHG | WEIGHT: 315 LBS

## 2022-11-10 VITALS
BODY MASS INDEX: 36.45 KG/M2 | DIASTOLIC BLOOD PRESSURE: 86 MMHG | HEART RATE: 87 BPM | OXYGEN SATURATION: 95 % | SYSTOLIC BLOOD PRESSURE: 120 MMHG | HEIGHT: 78 IN | WEIGHT: 315 LBS

## 2022-11-10 DIAGNOSIS — Z00.00 PHYSICAL EXAM, ANNUAL: Primary | ICD-10-CM

## 2022-11-10 DIAGNOSIS — R73.03 PREDIABETES: ICD-10-CM

## 2022-11-10 DIAGNOSIS — I10 PRIMARY HYPERTENSION: ICD-10-CM

## 2022-11-10 DIAGNOSIS — I48.19 ATRIAL FIBRILLATION, PERSISTENT: ICD-10-CM

## 2022-11-10 DIAGNOSIS — E66.01 MORBID OBESITY: ICD-10-CM

## 2022-11-10 DIAGNOSIS — I48.19 PERSISTENT ATRIAL FIBRILLATION: ICD-10-CM

## 2022-11-10 DIAGNOSIS — E78.2 MIXED HYPERLIPIDEMIA: ICD-10-CM

## 2022-11-10 LAB
ANION GAP SERPL CALCULATED.3IONS-SCNC: 7 MMOL/L (ref 5–15)
BUN BLDA-MCNC: 17 MG/DL (ref 8–26)
BUN SERPL-MCNC: 15 MG/DL (ref 8–23)
BUN/CREAT SERPL: 19 (ref 7–25)
CA-I BLDA-SCNC: 1.29 MMOL/L (ref 1.2–1.32)
CALCIUM SPEC-SCNC: 9.7 MG/DL (ref 8.6–10.5)
CHLORIDE BLDA-SCNC: 97 MMOL/L (ref 98–109)
CHLORIDE SERPL-SCNC: 101 MMOL/L (ref 98–107)
CO2 BLDA-SCNC: 33 MMOL/L (ref 24–29)
CO2 SERPL-SCNC: 33 MMOL/L (ref 22–29)
CREAT BLDA-MCNC: 0.8 MG/DL (ref 0.6–1.3)
CREAT SERPL-MCNC: 0.79 MG/DL (ref 0.76–1.27)
DEPRECATED RDW RBC AUTO: 45.1 FL (ref 37–54)
EGFRCR SERPLBLD CKD-EPI 2021: 98.8 ML/MIN/1.73
EGFRCR SERPLBLD CKD-EPI 2021: 99.2 ML/MIN/1.73
ERYTHROCYTE [DISTWIDTH] IN BLOOD BY AUTOMATED COUNT: 13.2 % (ref 12.3–15.4)
GLUCOSE BLDC GLUCOMTR-MCNC: 105 MG/DL (ref 70–130)
GLUCOSE SERPL-MCNC: 106 MG/DL (ref 65–99)
HCT VFR BLD AUTO: 52.9 % (ref 37.5–51)
HCT VFR BLDA CALC: 53 % (ref 38–51)
HGB BLD-MCNC: 17.8 G/DL (ref 13–17.7)
HGB BLDA-MCNC: 18 G/DL (ref 12–17)
MAXIMAL PREDICTED HEART RATE: 156 BPM
MCH RBC QN AUTO: 31.5 PG (ref 26.6–33)
MCHC RBC AUTO-ENTMCNC: 33.6 G/DL (ref 31.5–35.7)
MCV RBC AUTO: 93.6 FL (ref 79–97)
PLATELET # BLD AUTO: 187 10*3/MM3 (ref 140–450)
PMV BLD AUTO: 8.8 FL (ref 6–12)
POTASSIUM BLDA-SCNC: 4 MMOL/L (ref 3.5–4.9)
POTASSIUM SERPL-SCNC: 4.3 MMOL/L (ref 3.5–5.2)
QT INTERVAL: 414 MS
QTC INTERVAL: 402 MS
RBC # BLD AUTO: 5.65 10*6/MM3 (ref 4.14–5.8)
SODIUM BLD-SCNC: 141 MMOL/L (ref 138–146)
SODIUM SERPL-SCNC: 141 MMOL/L (ref 136–145)
STRESS TARGET HR: 133 BPM
WBC NRBC COR # BLD: 4.71 10*3/MM3 (ref 3.4–10.8)

## 2022-11-10 PROCEDURE — 80047 BASIC METABLC PNL IONIZED CA: CPT

## 2022-11-10 PROCEDURE — 93005 ELECTROCARDIOGRAM TRACING: CPT | Performed by: INTERNAL MEDICINE

## 2022-11-10 PROCEDURE — 80048 BASIC METABOLIC PNL TOTAL CA: CPT | Performed by: INTERNAL MEDICINE

## 2022-11-10 PROCEDURE — 92960 CARDIOVERSION ELECTRIC EXT: CPT

## 2022-11-10 PROCEDURE — 92960 CARDIOVERSION ELECTRIC EXT: CPT | Performed by: INTERNAL MEDICINE

## 2022-11-10 PROCEDURE — 85014 HEMATOCRIT: CPT

## 2022-11-10 PROCEDURE — 36415 COLL VENOUS BLD VENIPUNCTURE: CPT

## 2022-11-10 PROCEDURE — 99396 PREV VISIT EST AGE 40-64: CPT | Performed by: PHYSICIAN ASSISTANT

## 2022-11-10 PROCEDURE — 85027 COMPLETE CBC AUTOMATED: CPT | Performed by: INTERNAL MEDICINE

## 2022-11-10 RX ORDER — NALOXONE HCL 0.4 MG/ML
0.4 VIAL (ML) INJECTION ONCE AS NEEDED
Status: DISCONTINUED | OUTPATIENT
Start: 2022-11-10 | End: 2022-11-10 | Stop reason: SDUPTHER

## 2022-11-10 RX ORDER — FENTANYL CITRATE 50 UG/ML
50-100 INJECTION, SOLUTION INTRAMUSCULAR; INTRAVENOUS ONCE AS NEEDED
Status: DISCONTINUED | OUTPATIENT
Start: 2022-11-10 | End: 2022-11-10 | Stop reason: HOSPADM

## 2022-11-10 RX ORDER — FLUMAZENIL 0.1 MG/ML
0.5 INJECTION INTRAVENOUS ONCE AS NEEDED
Status: DISCONTINUED | OUTPATIENT
Start: 2022-11-10 | End: 2022-11-10 | Stop reason: HOSPADM

## 2022-11-10 RX ORDER — SODIUM CHLORIDE 0.9 % (FLUSH) 0.9 %
10 SYRINGE (ML) INJECTION AS NEEDED
Status: DISCONTINUED | OUTPATIENT
Start: 2022-11-10 | End: 2022-11-10 | Stop reason: HOSPADM

## 2022-11-10 RX ORDER — FLUMAZENIL 0.1 MG/ML
0.5 INJECTION INTRAVENOUS ONCE AS NEEDED
Status: DISCONTINUED | OUTPATIENT
Start: 2022-11-10 | End: 2022-11-10 | Stop reason: SDUPTHER

## 2022-11-10 RX ORDER — MIDAZOLAM HYDROCHLORIDE 1 MG/ML
2-8 INJECTION INTRAMUSCULAR; INTRAVENOUS ONCE AS NEEDED
Status: DISCONTINUED | OUTPATIENT
Start: 2022-11-10 | End: 2022-11-10 | Stop reason: SDUPTHER

## 2022-11-10 RX ORDER — SODIUM CHLORIDE 9 MG/ML
20 INJECTION, SOLUTION INTRAVENOUS CONTINUOUS
Status: DISCONTINUED | OUTPATIENT
Start: 2022-11-10 | End: 2022-11-10 | Stop reason: HOSPADM

## 2022-11-10 RX ORDER — HYDROCHLOROTHIAZIDE 25 MG/1
25 TABLET ORAL EVERY MORNING
COMMUNITY
End: 2022-11-30 | Stop reason: SDUPTHER

## 2022-11-10 RX ORDER — MIDAZOLAM HYDROCHLORIDE 1 MG/ML
2-8 INJECTION INTRAMUSCULAR; INTRAVENOUS ONCE AS NEEDED
Status: DISCONTINUED | OUTPATIENT
Start: 2022-11-10 | End: 2022-11-10 | Stop reason: HOSPADM

## 2022-11-10 RX ORDER — FENTANYL CITRATE 50 UG/ML
50-100 INJECTION, SOLUTION INTRAMUSCULAR; INTRAVENOUS ONCE AS NEEDED
Status: DISCONTINUED | OUTPATIENT
Start: 2022-11-10 | End: 2022-11-10 | Stop reason: SDUPTHER

## 2022-11-10 RX ORDER — NALOXONE HCL 0.4 MG/ML
0.4 VIAL (ML) INJECTION ONCE AS NEEDED
Status: DISCONTINUED | OUTPATIENT
Start: 2022-11-10 | End: 2022-11-10 | Stop reason: HOSPADM

## 2022-11-10 RX ORDER — TIRZEPATIDE 5 MG/.5ML
5 INJECTION, SOLUTION SUBCUTANEOUS
Qty: 6 ML | Refills: 0 | Status: SHIPPED | OUTPATIENT
Start: 2022-11-10 | End: 2022-11-30 | Stop reason: SDUPTHER

## 2022-11-10 RX ORDER — SODIUM CHLORIDE 0.9 % (FLUSH) 0.9 %
10 SYRINGE (ML) INJECTION EVERY 12 HOURS SCHEDULED
Status: DISCONTINUED | OUTPATIENT
Start: 2022-11-10 | End: 2022-11-10 | Stop reason: HOSPADM

## 2022-11-10 NOTE — PROGRESS NOTES
Chief Complaint   Patient presents with   • Annual Exam       Brian Bryan Jr. is a pleasant 62 y.o. male who is here for annual physical exam.  Patient presents for management of morbid obesity, alcohol abuse, hypertension, atrial fibrillation, hyperlipidemia, prediabetes and suspected KEN.    Patient has appointment this afternoon for cardioversion by Dr. Alford.  He was recently switched from metoprolol to sotalol.  He is compliant on Eliquis.  He is on atorvastatin 20 mg nightly.    Patient is compliant on HCTZ.  Has swelling in ankles after driving for long periods of times.  This improves with leg elevation.    Since originally being seen, patient has stopped drinking energy drinks and alcohol.  He has been eating healthier and has lost 13 lbs.  He is motivated to continue working on weight loss and a healthier lifestyle.        Past Medical History:   Diagnosis Date   • A-fib (HCC)    • Colon polyp [id number]?/    Had a few polyps removed last colonoscopy   • Obesity     I need to lose alot of weight       Past Surgical History:   Procedure Laterality Date   • ABDOMINAL SURGERY  2018    lipoma   • HERNIA REPAIR N/A    • TOTAL HIP ARTHROPLASTY Left        Family History   Problem Relation Age of Onset   • Breast cancer Mother    • Leukemia Mother    • Prostate cancer Father    • Breast cancer Sister    • Breast cancer Paternal Aunt    • Colon cancer Neg Hx        Social History     Socioeconomic History   • Marital status:    Tobacco Use   • Smoking status: Former     Packs/day: 0.50     Years: 2.00     Pack years: 1.00     Types: Cigarettes     Start date: 1977     Quit date: 1979     Years since quittin.8   • Smokeless tobacco: Never   • Tobacco comments:     None   Vaping Use   • Vaping Use: Never used   Substance and Sexual Activity   • Alcohol use: Yes     Alcohol/week: 4.0 standard drinks     Types: 4 Shots of liquor per week     Comment: drinks maker's kp once in  "awhile.   • Drug use: No   • Sexual activity: Yes     Partners: Female     Comment: Just my wife       Allergies   Allergen Reactions   • Lisinopril Cough       ROS  Review of Systems   Constitutional: Negative for chills, diaphoresis, fatigue and fever.   HENT: Negative for congestion, ear pain, hearing loss, postnasal drip, rhinorrhea and sore throat.    Eyes: Negative for blurred vision, pain and visual disturbance.   Respiratory: Negative for cough, shortness of breath and wheezing.    Cardiovascular: Negative for chest pain and leg swelling.   Gastrointestinal: Negative for abdominal pain, blood in stool, constipation, diarrhea, nausea, vomiting and indigestion.   Endocrine: Negative for polyuria.   Genitourinary: Negative for dysuria, flank pain and hematuria.   Musculoskeletal: Negative for arthralgias, gait problem and myalgias.   Skin: Negative for rash and skin lesions.   Neurological: Negative for dizziness, weakness, light-headedness, numbness and headache.   Psychiatric/Behavioral: Negative for self-injury, sleep disturbance, suicidal ideas, depressed mood and stress. The patient is not nervous/anxious.        Vitals:    11/10/22 0854   BP: 120/86   Pulse: 87   SpO2: 95%   Weight: (!) 165 kg (363 lb)   Height: 203.2 cm (80\")     Body mass index is 39.88 kg/m².    Class 2 Severe Obesity (BMI >=35 and <=39.9). Obesity-related health conditions include the following: obstructive sleep apnea, hypertension, dyslipidemias and peripheral vascular disease. Obesity is improving with lifestyle modifications. BMI is is above average; BMI management plan is completed. We discussed portion control and increasing exercise.       Current Outpatient Medications on File Prior to Visit   Medication Sig Dispense Refill   • apixaban (ELIQUIS) 5 MG tablet tablet Take 1 tablet by mouth 2 (Two) Times a Day. 180 tablet 3   • atorvastatin (LIPITOR) 20 MG tablet Take 1 tablet by mouth Every Night. 90 tablet 3   • sotalol " (Betapace) 80 MG tablet Take 1 tablet by mouth 2 (Two) Times a Day. 180 tablet 1   • [DISCONTINUED] hydroCHLOROthiazide (HYDRODIURIL) 12.5 MG tablet Take 1 tablet by mouth Daily. 90 tablet 1     No current facility-administered medications on file prior to visit.       Results for orders placed or performed during the hospital encounter of 10/17/22   Adult Transthoracic Echo Complete W/ Cont if Necessary Per Protocol   Result Value Ref Range    Target HR (85%) 133 bpm    Max. Pred. HR (100%) 156 bpm    EF(MOD-bp) 53.0 %    LVIDd 5.1 cm    LVIDs 3.6 cm    IVSd 1.42 cm    LVPWd 1.39 cm    FS 28.3 %    IVS/LVPW 1.02 cm    ESV(cubed) 47.8 ml    EDV(cubed) 129.6 ml    LV mass(C)d 298.4 grams    EDV(MOD-sp2) 71.0 ml    EDV(MOD-sp4) 105.0 ml    ESV(MOD-sp2) 31.0 ml    ESV(MOD-sp4) 51.0 ml    SV(MOD-sp2) 40.0 ml    SV(MOD-sp4) 54.0 ml    EF(MOD-sp2) 56.3 %    EF(MOD-sp4) 51.4 %    MV E max sharif 101.0 cm/sec    MV dec time 0.26 msec    LA ESV Index (BP) 31.9 ml/m2    LA dimension (2D)  3.5 cm    LV V1 max 121.0 cm/sec    LV V1 max PG 5.9 mmHg    LV V1 mean PG 3.0 mmHg    LV V1 VTI 20.6 cm    Ao pk sharif 143.0 cm/sec    Ao max PG 8.2 mmHg    Ao mean PG 5.0 mmHg    Ao V2 VTI 22.9 cm    MV P1/2t 84.4 msec    MVA(P1/2t) 2.6 cm2    MV dec slope 413.0 cm/sec2    Ao root diam 4.4 cm    Echo EF Estimated 50 %    IVRT 95.0 msec    Med Peak E' Sharif 7.50 cm/sec    Lat Peak E' Sharif 8.4 cm/sec    Avg E/e' ratio 12.70     RV Base 3.20 cm    RV Mid 2.40 cm    RV Length 7.70 cm    TAPSE (>1.6) 1.90 cm    RV S' 14.10 cm/sec    Ascending aorta 4.3 cm       PE    Physical Exam  Vitals reviewed.   Constitutional:       General: He is not in acute distress.     Appearance: Normal appearance. He is well-developed. He is obese. He is not ill-appearing or diaphoretic.   HENT:      Head: Normocephalic and atraumatic.      Right Ear: Hearing, tympanic membrane, ear canal and external ear normal.      Left Ear: Hearing, tympanic membrane, ear canal and  external ear normal.      Nose: Nose normal.      Right Sinus: No maxillary sinus tenderness or frontal sinus tenderness.      Left Sinus: No maxillary sinus tenderness or frontal sinus tenderness.      Mouth/Throat:      Pharynx: Uvula midline.   Eyes:      General: Lids are normal.      Extraocular Movements: Extraocular movements intact.      Conjunctiva/sclera: Conjunctivae normal.   Neck:      Thyroid: No thyroid mass or thyromegaly.      Trachea: Trachea and phonation normal.   Cardiovascular:      Rate and Rhythm: Normal rate. Rhythm irregular.   Pulmonary:      Effort: Pulmonary effort is normal.      Breath sounds: Normal breath sounds.   Abdominal:      General: Abdomen is protuberant. There is no distension.      Palpations: Abdomen is soft. Abdomen is not rigid.      Tenderness: There is no abdominal tenderness. There is no guarding.   Musculoskeletal:         General: Normal range of motion.      Cervical back: Normal range of motion.      Right lower leg: Edema present.      Left lower leg: Edema present.      Comments: Mild bilateral leg/foot swelling with purplish skin discoloration.  No pain with palpation.   Lymphadenopathy:      Cervical: No cervical adenopathy.      Right cervical: No superficial cervical adenopathy.     Left cervical: No superficial cervical adenopathy.   Skin:     General: Skin is warm.      Coloration: Skin is cyanotic.      Findings: No erythema or rash.      Nails: There is no clubbing.   Neurological:      Mental Status: He is alert and oriented to person, place, and time.      Coordination: Coordination normal.      Gait: Gait normal.      Deep Tendon Reflexes: Reflexes are normal and symmetric.      Comments: CN grossly intact   Psychiatric:         Attention and Perception: Attention and perception normal. He is attentive.         Mood and Affect: Mood and affect normal.         Speech: Speech normal.         Behavior: Behavior normal. Behavior is cooperative.          Thought Content: Thought content normal.         Cognition and Memory: Cognition and memory normal.         Judgment: Judgment normal.         A/P    Diagnoses and all orders for this visit:    1. Physical exam, annual (Primary)  PE completed  Preventative labs reviewed  Colonoscopy - scheduled  Dentist-encouraged to go regularly  Ophthalmologist-encouraged to go regularly  Dermatologist-encouraged to go regularly  Vaccinations discussed    2. Morbid obesity (HCC)  Patient has lost 13 lbs.  He quit drinking alcohol and is eating only healthy foods.  Will add in Mounjaro for his prediabetes.  Pending sleep study.  Return in 4 months.    3. Primary hypertension  Stable, well-controlled.  Compliant on medication.    4. Persistent atrial fibrillation (HCC)  On Eliquis and Sotalol.  Has conversation today with Dr. Alford.    5. Mixed hyperlipidemia  On atorvastatin 20 mg daily.    6. Prediabetes  -     Tirzepatide (Mounjaro) 5 MG/0.5ML solution pen-injector; Inject 0.5 mL under the skin into the appropriate area as directed Every 7 (Seven) Days.  Dispense: 6 mL; Refill: 0  Samples given to patient today.  Return in 4 months.  Call sooner if he has any questions or concerns.      Plan of care reviewed with patient at the conclusion of today's visit. Education was provided regarding diet , nutrition , exercise, weight management, supplements, preventative screenings, vaccinations, hypertension, hyperlipidemia and diabetes diagnosis, management and any prescribed or recommended OTC medications.  Patient verbalizes understanding of and agreement with management plan.    Dictated Utilizing Dragon Dictation     Please note that portions of this note were completed with a voice recognition program.     Part of this note may be an electronic transcription/translation of spoken language to printed text using the Dragon Dictation System.    Return in about 4 months (around 3/10/2023) for Recheck, obesity.     Eli CHARLES  SOLIS Mijares

## 2022-11-10 NOTE — INTERVAL H&P NOTE
H&P reviewed. The patient was examined and there are no changes to the H&P.      MARIANNE Alford MD [facility], [facility]  Interventional and General Cardiology

## 2022-11-16 ENCOUNTER — TELEPHONE (OUTPATIENT)
Dept: CARDIOLOGY | Facility: CLINIC | Age: 62
End: 2022-11-16

## 2022-11-16 NOTE — TELEPHONE ENCOUNTER
Patient called to report that he is back in afib. He has an apple watch and it alerted him this AM. He does not typically have symptoms. He had a cardioversion 10/10/2022 and says he has stayed in rhythm since then until this morning.     He is on:  Eliquis 5 mg BID  Sotalol 80 mg BID    He believes he has untreated sleep apnea (recently saw pulmonology 11/2/2022) and has a sleep study scheduled 12/6/2022.     Any changes to meds in the meantime?

## 2022-11-16 NOTE — TELEPHONE ENCOUNTER
He said he didn't feel bad before the cardioversion and he doesn't feel bad now. He just noticed it on his watch today. HR 70's. He said he is fine to wait to see after he gets his CPAP if that makes it better.

## 2022-11-28 ENCOUNTER — PRIOR AUTHORIZATION (OUTPATIENT)
Dept: FAMILY MEDICINE CLINIC | Facility: CLINIC | Age: 62
End: 2022-11-28

## 2022-11-30 ENCOUNTER — PATIENT MESSAGE (OUTPATIENT)
Dept: FAMILY MEDICINE CLINIC | Facility: CLINIC | Age: 62
End: 2022-11-30

## 2022-11-30 DIAGNOSIS — R73.03 PREDIABETES: ICD-10-CM

## 2022-11-30 DIAGNOSIS — I10 PRIMARY HYPERTENSION: Primary | ICD-10-CM

## 2022-11-30 RX ORDER — TIRZEPATIDE 5 MG/.5ML
5 INJECTION, SOLUTION SUBCUTANEOUS
Qty: 6 ML | Refills: 0 | Status: SHIPPED | OUTPATIENT
Start: 2022-11-30 | End: 2022-12-19 | Stop reason: SDUPTHER

## 2022-11-30 RX ORDER — HYDROCHLOROTHIAZIDE 25 MG/1
25 TABLET ORAL EVERY MORNING
Qty: 90 TABLET | Refills: 1 | Status: SHIPPED | OUTPATIENT
Start: 2022-11-30 | End: 2023-03-27 | Stop reason: SDUPTHER

## 2022-11-30 NOTE — TELEPHONE ENCOUNTER
Approvedon November 28  Request Reference Number: [id number]. MOUNJARO INJ 5MG/0.5 is approved through 11/28/2023.    Pt notified via [employer].

## 2022-12-01 ENCOUNTER — TELEPHONE (OUTPATIENT)
Dept: GASTROENTEROLOGY | Facility: CLINIC | Age: 62
End: 2022-12-01

## 2022-12-01 NOTE — TELEPHONE ENCOUNTER
December 1, 2022 [address] [place] [address]  Phone: [phone]  Fax: [phone]           Brian Bryan Jr.  [address] [place] [id number]  1960        Patient will be having a a colonoscopy by Dr. Inder Lay on December 28, 2022. The patient is needing cardiac clearance due to being on blood thinners. Please complete the following and fax back to the office.     Patient's was last seen in the office on:_____________________    Procedure/Test Performed:    _____ Stress Test       _____ Echocardiogram    _____ EKG     _____ Heart Cath    Based on the above test results and/or clinical evaluation it is my recommendation:    ____ Patient may proceed with the scheduled surgical procedure with an acceptable cardiac risk.    ____ Patient CAN NOT stop Plavix, Effient, Ticlid, Aspirin, Coumadin, Pradaxa, Xarelto, Eliquis, Savaysa, or Brilinta prior to procedure.     ____ Patient CAN stop Plavix, Effient, Ticlid, Aspirin, Coumadin, Pradaxa, Xarelto, Eliquis, Savaysa, or Brilinta  ______ days prior to procedure.    Please sign and date below.    Signature________________________________________   Date:_________________     Thank You      Inder Lay M.D.

## 2022-12-06 ENCOUNTER — APPOINTMENT (OUTPATIENT)
Dept: SLEEP MEDICINE | Facility: HOSPITAL | Age: 62
End: 2022-12-06

## 2022-12-08 ENCOUNTER — HOSPITAL ENCOUNTER (OUTPATIENT)
Dept: SLEEP MEDICINE | Facility: HOSPITAL | Age: 62
Discharge: HOME | End: 2022-12-08
Admitting: NURSE PRACTITIONER

## 2022-12-08 DIAGNOSIS — R29.818 SUSPECTED SLEEP APNEA: ICD-10-CM

## 2022-12-08 DIAGNOSIS — I48.20 CHRONIC ATRIAL FIBRILLATION: ICD-10-CM

## 2022-12-08 DIAGNOSIS — G47.19 EXCESSIVE DAYTIME SLEEPINESS: ICD-10-CM

## 2022-12-08 DIAGNOSIS — R06.83 SNORING: ICD-10-CM

## 2022-12-08 PROCEDURE — 95800 SLP STDY UNATTENDED: CPT

## 2022-12-08 PROCEDURE — 95800 SLP STDY UNATTENDED: CPT | Performed by: INTERNAL MEDICINE

## 2022-12-09 VITALS — WEIGHT: 315 LBS | HEIGHT: 78 IN | BODY MASS INDEX: 36.45 KG/M2

## 2022-12-12 ENCOUNTER — TELEPHONE (OUTPATIENT)
Dept: GASTROENTEROLOGY | Facility: CLINIC | Age: 62
End: 2022-12-12

## 2022-12-12 DIAGNOSIS — R06.83 SNORING: ICD-10-CM

## 2022-12-12 DIAGNOSIS — G47.33 OSA (OBSTRUCTIVE SLEEP APNEA): Primary | ICD-10-CM

## 2022-12-12 RX ORDER — SODIUM, POTASSIUM,MAG SULFATES 17.5-3.13G
1 SOLUTION, RECONSTITUTED, ORAL ORAL TAKE AS DIRECTED
Qty: 354 ML | Refills: 0 | Status: SHIPPED | OUTPATIENT
Start: 2022-12-12 | End: 2022-12-12

## 2022-12-12 RX ORDER — SODIUM, POTASSIUM,MAG SULFATES 17.5-3.13G
1 SOLUTION, RECONSTITUTED, ORAL ORAL TAKE AS DIRECTED
Qty: 354 ML | Refills: 0 | Status: ON HOLD | OUTPATIENT
Start: 2022-12-12 | End: 2023-01-30

## 2022-12-12 NOTE — TELEPHONE ENCOUNTER
LEFT VOICE MESSAGE ON PHARMACY VOICE MAIL.   ERROR- SENT IN TWO SURPEP PRESCRIPTIONS. PATIENT ONLY NEED ONE BOWEL PREP KIT.

## 2022-12-13 NOTE — PROGRESS NOTES
CALLED PATIENT AND ADVISED OF STUDY RESULTS. PATIENT VERBALIZED UNDERSTANDING AND WAS AGREEABLE TO PAP THERAPY. FAXED ORDER TO [facility] 12/13/22 [facility]

## 2022-12-19 DIAGNOSIS — R73.03 PREDIABETES: ICD-10-CM

## 2022-12-19 RX ORDER — TIRZEPATIDE 5 MG/.5ML
5 INJECTION, SOLUTION SUBCUTANEOUS
Qty: 6 ML | Refills: 0 | Status: SHIPPED | OUTPATIENT
Start: 2022-12-19 | End: 2023-01-26 | Stop reason: SDUPTHER

## 2022-12-28 ENCOUNTER — OUTSIDE FACILITY SERVICE (OUTPATIENT)
Dept: GASTROENTEROLOGY | Facility: CLINIC | Age: 62
End: 2022-12-28
Payer: COMMERCIAL

## 2022-12-28 PROCEDURE — 45380 COLONOSCOPY AND BIOPSY: CPT | Performed by: INTERNAL MEDICINE

## 2022-12-28 PROCEDURE — 45385 COLONOSCOPY W/LESION REMOVAL: CPT | Performed by: INTERNAL MEDICINE

## 2022-12-28 PROCEDURE — 88305 TISSUE EXAM BY PATHOLOGIST: CPT

## 2022-12-29 ENCOUNTER — LAB REQUISITION (OUTPATIENT)
Dept: LAB | Facility: HOSPITAL | Age: 62
End: 2022-12-29
Payer: COMMERCIAL

## 2022-12-29 DIAGNOSIS — K57.30 DIVERTICULOSIS OF LARGE INTESTINE WITHOUT PERFORATION OR ABSCESS WITHOUT BLEEDING: ICD-10-CM

## 2022-12-29 DIAGNOSIS — D12.3 BENIGN NEOPLASM OF TRANSVERSE COLON: ICD-10-CM

## 2022-12-29 DIAGNOSIS — Z12.11 ENCOUNTER FOR SCREENING FOR MALIGNANT NEOPLASM OF COLON: ICD-10-CM

## 2022-12-29 DIAGNOSIS — K64.8 OTHER HEMORRHOIDS: ICD-10-CM

## 2022-12-29 DIAGNOSIS — Z80.3 FAMILY HISTORY OF MALIGNANT NEOPLASM OF BREAST: ICD-10-CM

## 2022-12-29 DIAGNOSIS — Z86.010 PERSONAL HISTORY OF COLONIC POLYPS: ICD-10-CM

## 2022-12-30 ENCOUNTER — TELEPHONE (OUTPATIENT)
Dept: GASTROENTEROLOGY | Facility: CLINIC | Age: 62
End: 2022-12-30

## 2022-12-30 LAB — REF LAB TEST METHOD: NORMAL

## 2022-12-30 NOTE — TELEPHONE ENCOUNTER
----- Message from Inder Lay MD sent at 12/30/2022  3:14 PM EST -----  Let Mr. Bryan know he had one tubulovillous adenoma and 1 tubular adenoma.  I would recommend repeat examination in 1 year.

## 2023-01-26 ENCOUNTER — PATIENT MESSAGE (OUTPATIENT)
Dept: CARDIOLOGY | Facility: CLINIC | Age: 63
End: 2023-01-26
Payer: COMMERCIAL

## 2023-01-26 DIAGNOSIS — R73.03 PREDIABETES: ICD-10-CM

## 2023-01-26 DIAGNOSIS — I48.19 PERSISTENT ATRIAL FIBRILLATION: Primary | ICD-10-CM

## 2023-01-26 RX ORDER — TIRZEPATIDE 5 MG/.5ML
5 INJECTION, SOLUTION SUBCUTANEOUS
Qty: 6 ML | Refills: 0 | Status: SHIPPED | OUTPATIENT
Start: 2023-01-26 | End: 2023-03-08

## 2023-01-26 RX ORDER — SOTALOL HYDROCHLORIDE 120 MG/1
120 TABLET ORAL 2 TIMES DAILY
Qty: 60 TABLET | Refills: 0 | Status: SHIPPED | OUTPATIENT
Start: 2023-01-26 | End: 2023-01-30 | Stop reason: HOSPADM

## 2023-01-26 NOTE — TELEPHONE ENCOUNTER
Lets arrange reattempted cardioversion.  Have him start sotalol 120 mg twice daily 48 hours prior to cardioversion.  He will need to stop metoprolol at that time.

## 2023-01-27 ENCOUNTER — PREP FOR SURGERY (OUTPATIENT)
Dept: OTHER | Facility: HOSPITAL | Age: 63
End: 2023-01-27
Payer: COMMERCIAL

## 2023-01-27 DIAGNOSIS — I48.19 ATRIAL FIBRILLATION, PERSISTENT: Primary | ICD-10-CM

## 2023-01-27 RX ORDER — FLUMAZENIL 0.1 MG/ML
0.5 INJECTION INTRAVENOUS ONCE AS NEEDED
Status: CANCELLED | OUTPATIENT
Start: 2023-01-27

## 2023-01-27 RX ORDER — NALOXONE HCL 0.4 MG/ML
0.4 VIAL (ML) INJECTION ONCE AS NEEDED
Status: CANCELLED | OUTPATIENT
Start: 2023-01-27

## 2023-01-27 RX ORDER — SODIUM CHLORIDE 0.9 % (FLUSH) 0.9 %
10 SYRINGE (ML) INJECTION EVERY 12 HOURS SCHEDULED
Status: CANCELLED | OUTPATIENT
Start: 2023-01-27

## 2023-01-27 RX ORDER — MIDAZOLAM HYDROCHLORIDE 1 MG/ML
2-8 INJECTION INTRAMUSCULAR; INTRAVENOUS ONCE AS NEEDED
Status: CANCELLED | OUTPATIENT
Start: 2023-01-27

## 2023-01-27 RX ORDER — SODIUM CHLORIDE 0.9 % (FLUSH) 0.9 %
10 SYRINGE (ML) INJECTION AS NEEDED
Status: CANCELLED | OUTPATIENT
Start: 2023-01-27

## 2023-01-27 RX ORDER — FENTANYL CITRATE 50 UG/ML
50-100 INJECTION, SOLUTION INTRAMUSCULAR; INTRAVENOUS ONCE AS NEEDED
Status: CANCELLED | OUTPATIENT
Start: 2023-01-27

## 2023-01-27 RX ORDER — SODIUM CHLORIDE 9 MG/ML
20 INJECTION, SOLUTION INTRAVENOUS CONTINUOUS
Status: CANCELLED | OUTPATIENT
Start: 2023-01-27

## 2023-01-27 RX ORDER — SODIUM CHLORIDE 9 MG/ML
40 INJECTION, SOLUTION INTRAVENOUS AS NEEDED
Status: CANCELLED | OUTPATIENT
Start: 2023-01-27

## 2023-01-30 ENCOUNTER — PATIENT MESSAGE (OUTPATIENT)
Dept: FAMILY MEDICINE CLINIC | Facility: CLINIC | Age: 63
End: 2023-01-30
Payer: COMMERCIAL

## 2023-01-30 ENCOUNTER — HOSPITAL ENCOUNTER (OUTPATIENT)
Dept: CARDIOLOGY | Facility: HOSPITAL | Age: 63
Discharge: HOME | End: 2023-01-30
Attending: INTERNAL MEDICINE | Admitting: INTERNAL MEDICINE
Payer: COMMERCIAL

## 2023-01-30 VITALS
DIASTOLIC BLOOD PRESSURE: 87 MMHG | OXYGEN SATURATION: 94 % | HEART RATE: 70 BPM | BODY MASS INDEX: 36.45 KG/M2 | TEMPERATURE: 97.8 F | SYSTOLIC BLOOD PRESSURE: 121 MMHG | HEIGHT: 78 IN | WEIGHT: 315 LBS

## 2023-01-30 DIAGNOSIS — I48.19 PERSISTENT ATRIAL FIBRILLATION: ICD-10-CM

## 2023-01-30 DIAGNOSIS — I48.19 ATRIAL FIBRILLATION, PERSISTENT: ICD-10-CM

## 2023-01-30 LAB
ANION GAP SERPL CALCULATED.3IONS-SCNC: 9 MMOL/L (ref 5–15)
BUN SERPL-MCNC: 23 MG/DL (ref 8–23)
BUN/CREAT SERPL: 32.4 (ref 7–25)
CALCIUM SPEC-SCNC: 9.6 MG/DL (ref 8.6–10.5)
CHLORIDE SERPL-SCNC: 103 MMOL/L (ref 98–107)
CO2 SERPL-SCNC: 29 MMOL/L (ref 22–29)
CREAT SERPL-MCNC: 0.71 MG/DL (ref 0.76–1.27)
DEPRECATED RDW RBC AUTO: 44.6 FL (ref 37–54)
EGFRCR SERPLBLD CKD-EPI 2021: 102.5 ML/MIN/1.73
ERYTHROCYTE [DISTWIDTH] IN BLOOD BY AUTOMATED COUNT: 13.3 % (ref 12.3–15.4)
GLUCOSE SERPL-MCNC: 97 MG/DL (ref 65–99)
HCT VFR BLD AUTO: 52.1 % (ref 37.5–51)
HGB BLD-MCNC: 17.8 G/DL (ref 13–17.7)
MAXIMAL PREDICTED HEART RATE: 156 BPM
MCH RBC QN AUTO: 31.1 PG (ref 26.6–33)
MCHC RBC AUTO-ENTMCNC: 34.2 G/DL (ref 31.5–35.7)
MCV RBC AUTO: 90.9 FL (ref 79–97)
PLATELET # BLD AUTO: 182 10*3/MM3 (ref 140–450)
PMV BLD AUTO: 8.7 FL (ref 6–12)
POTASSIUM SERPL-SCNC: 4.2 MMOL/L (ref 3.5–5.2)
RBC # BLD AUTO: 5.73 10*6/MM3 (ref 4.14–5.8)
SODIUM SERPL-SCNC: 141 MMOL/L (ref 136–145)
STRESS TARGET HR: 133 BPM
WBC NRBC COR # BLD: 5.32 10*3/MM3 (ref 3.4–10.8)

## 2023-01-30 PROCEDURE — 36415 COLL VENOUS BLD VENIPUNCTURE: CPT

## 2023-01-30 PROCEDURE — 92960 CARDIOVERSION ELECTRIC EXT: CPT | Performed by: INTERNAL MEDICINE

## 2023-01-30 PROCEDURE — 93010 ELECTROCARDIOGRAM REPORT: CPT | Performed by: INTERNAL MEDICINE

## 2023-01-30 PROCEDURE — 92960 CARDIOVERSION ELECTRIC EXT: CPT

## 2023-01-30 PROCEDURE — 80048 BASIC METABOLIC PNL TOTAL CA: CPT | Performed by: INTERNAL MEDICINE

## 2023-01-30 PROCEDURE — 93005 ELECTROCARDIOGRAM TRACING: CPT | Performed by: INTERNAL MEDICINE

## 2023-01-30 PROCEDURE — 85027 COMPLETE CBC AUTOMATED: CPT | Performed by: INTERNAL MEDICINE

## 2023-01-30 PROCEDURE — S0260 H&P FOR SURGERY: HCPCS | Performed by: PHYSICIAN ASSISTANT

## 2023-01-30 RX ORDER — SODIUM CHLORIDE 9 MG/ML
20 INJECTION, SOLUTION INTRAVENOUS CONTINUOUS
Status: DISCONTINUED | OUTPATIENT
Start: 2023-01-30 | End: 2023-01-30 | Stop reason: HOSPADM

## 2023-01-30 RX ORDER — MIDAZOLAM HYDROCHLORIDE 1 MG/ML
2-8 INJECTION INTRAMUSCULAR; INTRAVENOUS ONCE AS NEEDED
Status: DISCONTINUED | OUTPATIENT
Start: 2023-01-30 | End: 2023-01-30 | Stop reason: HOSPADM

## 2023-01-30 RX ORDER — FLUMAZENIL 0.1 MG/ML
0.5 INJECTION INTRAVENOUS ONCE AS NEEDED
Status: DISCONTINUED | OUTPATIENT
Start: 2023-01-30 | End: 2023-01-30 | Stop reason: HOSPADM

## 2023-01-30 RX ORDER — FLUMAZENIL 0.1 MG/ML
0.5 INJECTION INTRAVENOUS ONCE AS NEEDED
Status: DISCONTINUED | OUTPATIENT
Start: 2023-01-30 | End: 2023-01-30 | Stop reason: SDUPTHER

## 2023-01-30 RX ORDER — MIDAZOLAM HYDROCHLORIDE 1 MG/ML
2-8 INJECTION INTRAMUSCULAR; INTRAVENOUS ONCE AS NEEDED
Status: DISCONTINUED | OUTPATIENT
Start: 2023-01-30 | End: 2023-01-30 | Stop reason: SDUPTHER

## 2023-01-30 RX ORDER — NALOXONE HCL 0.4 MG/ML
0.4 VIAL (ML) INJECTION ONCE AS NEEDED
Status: DISCONTINUED | OUTPATIENT
Start: 2023-01-30 | End: 2023-01-30 | Stop reason: SDUPTHER

## 2023-01-30 RX ORDER — FENTANYL CITRATE 50 UG/ML
50-100 INJECTION, SOLUTION INTRAMUSCULAR; INTRAVENOUS ONCE AS NEEDED
Status: DISCONTINUED | OUTPATIENT
Start: 2023-01-30 | End: 2023-01-30 | Stop reason: HOSPADM

## 2023-01-30 RX ORDER — FENTANYL CITRATE 50 UG/ML
50-100 INJECTION, SOLUTION INTRAMUSCULAR; INTRAVENOUS ONCE AS NEEDED
Status: DISCONTINUED | OUTPATIENT
Start: 2023-01-30 | End: 2023-01-30 | Stop reason: SDUPTHER

## 2023-01-30 RX ORDER — SODIUM CHLORIDE 0.9 % (FLUSH) 0.9 %
10 SYRINGE (ML) INJECTION AS NEEDED
Status: DISCONTINUED | OUTPATIENT
Start: 2023-01-30 | End: 2023-01-30 | Stop reason: HOSPADM

## 2023-01-30 RX ORDER — NALOXONE HCL 0.4 MG/ML
0.4 VIAL (ML) INJECTION ONCE AS NEEDED
Status: DISCONTINUED | OUTPATIENT
Start: 2023-01-30 | End: 2023-01-30 | Stop reason: HOSPADM

## 2023-01-30 RX ORDER — SODIUM CHLORIDE 0.9 % (FLUSH) 0.9 %
10 SYRINGE (ML) INJECTION EVERY 12 HOURS SCHEDULED
Status: DISCONTINUED | OUTPATIENT
Start: 2023-01-30 | End: 2023-01-30 | Stop reason: HOSPADM

## 2023-01-30 RX ORDER — SODIUM CHLORIDE 9 MG/ML
40 INJECTION, SOLUTION INTRAVENOUS AS NEEDED
Status: DISCONTINUED | OUTPATIENT
Start: 2023-01-30 | End: 2023-01-30 | Stop reason: HOSPADM

## 2023-01-31 ENCOUNTER — TELEPHONE (OUTPATIENT)
Dept: FAMILY MEDICINE CLINIC | Facility: CLINIC | Age: 63
End: 2023-01-31
Payer: COMMERCIAL

## 2023-01-31 NOTE — TELEPHONE ENCOUNTER
Regarding: Appointment  Contact: [phone]  ----- Message from Kia Hernandez MA sent at 1/31/2023  9:06 AM EST -----       ----- Message from Brian Bryan Jr. to Eli Mijares PA-C sent at 1/31/2023  9:01 AM -----   Thank you Eli….    Is it possible that you could call me for a very brief question?  I am sorry to ask.  My number is [phone].      I really would be grateful.   I won’t take much time at all…    Mina      ----- Message -----       From:Eli Mijares       Sent:1/30/2023  1:28 PM EST         To:Brian Bryan Jr.    Subject:Appointment    Hi Mr. Bryan,    I saw that you had your cardioversion and am hopeful this worked.  It looks like our follow-up never got scheduled for end of February/early March.  I would like to see you back to check in on your weight and healthy lifestyle changes.  Please call and make an appointment.  [phone].    Jono,  Eli Mijares PA-C

## 2023-02-06 LAB
QT INTERVAL: 400 MS
QTC INTERVAL: 422 MS

## 2023-03-07 ENCOUNTER — PATIENT MESSAGE (OUTPATIENT)
Dept: FAMILY MEDICINE CLINIC | Facility: CLINIC | Age: 63
End: 2023-03-07
Payer: COMMERCIAL

## 2023-03-07 DIAGNOSIS — I10 PRIMARY HYPERTENSION: ICD-10-CM

## 2023-03-08 DIAGNOSIS — R73.03 PREDIABETES: Primary | ICD-10-CM

## 2023-03-08 RX ORDER — TIRZEPATIDE 7.5 MG/.5ML
7.5 INJECTION, SOLUTION SUBCUTANEOUS
Qty: 4 ML | Refills: 0 | Status: SHIPPED | OUTPATIENT
Start: 2023-03-08

## 2023-03-27 ENCOUNTER — PATIENT MESSAGE (OUTPATIENT)
Dept: CARDIOLOGY | Facility: CLINIC | Age: 63
End: 2023-03-27
Payer: COMMERCIAL

## 2023-03-27 DIAGNOSIS — I48.19 PERSISTENT ATRIAL FIBRILLATION: ICD-10-CM

## 2023-03-27 DIAGNOSIS — E78.2 MIXED HYPERLIPIDEMIA: ICD-10-CM

## 2023-03-27 RX ORDER — HYDROCHLOROTHIAZIDE 25 MG/1
25 TABLET ORAL EVERY MORNING
Qty: 90 TABLET | Refills: 1 | Status: SHIPPED | OUTPATIENT
Start: 2023-03-27

## 2023-03-27 RX ORDER — ATORVASTATIN CALCIUM 20 MG/1
20 TABLET, FILM COATED ORAL NIGHTLY
Qty: 90 TABLET | Refills: 3 | Status: SHIPPED | OUTPATIENT
Start: 2023-03-27

## 2023-03-27 NOTE — TELEPHONE ENCOUNTER
Rx Refill Note  Requested Prescriptions     Pending Prescriptions Disp Refills   • hydroCHLOROthiazide (HYDRODIURIL) 25 MG tablet 90 tablet 1     Sig: Take 1 tablet by mouth Every Morning.      Last office visit with prescribing clinician: 11/10/2022   Next office visit with prescribing clinician: 5/12/2023     Kia Hernandez MA  03/27/23, 10:30 EDT     Last fill: 11/30/2022

## 2023-03-27 NOTE — TELEPHONE ENCOUNTER
From: Brian Bryan Jr.  To: Korey Alford  Sent: 3/27/2023 10:28 AM EDT  Subject: I need refills urgently     Please issue refills to all three of meds you have me on. I am down to one day on one of them. Thank you.   Mina

## 2023-04-10 DIAGNOSIS — R73.03 PREDIABETES: ICD-10-CM

## 2023-04-11 RX ORDER — TIRZEPATIDE 7.5 MG/.5ML
INJECTION, SOLUTION SUBCUTANEOUS
Qty: 2 ML | Refills: 1 | Status: SHIPPED | OUTPATIENT
Start: 2023-04-11 | End: 2023-04-18

## 2023-04-11 NOTE — TELEPHONE ENCOUNTER
Rx Refill Note  Requested Prescriptions     Pending Prescriptions Disp Refills   • Mounjaro 7.5 MG/0.5ML solution pen-injector [Pharmacy Med Name: MOUNJARO 7.5 MG/0.5 ML PEN] 2 mL      Sig: INJECT 7.5 MG UNDER THE SKIN ONCE WEEKLY      Last office visit with prescribing clinician: 11/10/2022     Next office visit with prescribing clinician: 5/12/2023      Francine Nava MA  04/11/23, 08:41 EDT

## 2023-04-13 DIAGNOSIS — E78.2 MIXED HYPERLIPIDEMIA: ICD-10-CM

## 2023-04-13 DIAGNOSIS — I48.19 PERSISTENT ATRIAL FIBRILLATION: ICD-10-CM

## 2023-04-13 DIAGNOSIS — I10 PRIMARY HYPERTENSION: ICD-10-CM

## 2023-04-13 DIAGNOSIS — R73.03 PREDIABETES: ICD-10-CM

## 2023-04-14 ENCOUNTER — PATIENT MESSAGE (OUTPATIENT)
Dept: FAMILY MEDICINE CLINIC | Facility: CLINIC | Age: 63
End: 2023-04-14
Payer: COMMERCIAL

## 2023-04-14 RX ORDER — TIRZEPATIDE 7.5 MG/.5ML
INJECTION, SOLUTION SUBCUTANEOUS
Qty: 2 ML | Refills: 1 | OUTPATIENT
Start: 2023-04-14

## 2023-04-14 RX ORDER — HYDROCHLOROTHIAZIDE 25 MG/1
25 TABLET ORAL EVERY MORNING
Qty: 90 TABLET | Refills: 1 | OUTPATIENT
Start: 2023-04-14

## 2023-04-14 RX ORDER — ATORVASTATIN CALCIUM 20 MG/1
20 TABLET, FILM COATED ORAL NIGHTLY
Qty: 90 TABLET | Refills: 0 | Status: SHIPPED | OUTPATIENT
Start: 2023-04-14

## 2023-04-18 DIAGNOSIS — R73.03 PREDIABETES: ICD-10-CM

## 2023-04-18 RX ORDER — TIRZEPATIDE 7.5 MG/.5ML
INJECTION, SOLUTION SUBCUTANEOUS
Qty: 2 ML | Refills: 1 | Status: CANCELLED | OUTPATIENT
Start: 2023-04-18

## 2023-04-18 RX ORDER — TIRZEPATIDE 10 MG/.5ML
10 INJECTION, SOLUTION SUBCUTANEOUS
Qty: 2 ML | Refills: 0 | Status: SHIPPED | OUTPATIENT
Start: 2023-04-18

## 2023-04-18 NOTE — TELEPHONE ENCOUNTER
Rx Refill Note  Requested Prescriptions     Pending Prescriptions Disp Refills   • Tirzepatide (Mounjaro) 7.5 MG/0.5ML solution pen-injector 2 mL 1      Last office visit with prescribing clinician: 11/10/2022   Last telemedicine visit with prescribing clinician: 5/12/2023   Next office visit with prescribing clinician: 5/12/2023                         Would you like a call back once the refill request has been completed: [] Yes [] No    If the office needs to give you a call back, can they leave a voicemail: [] Yes [] No    Braxton Bray MA  04/18/23, 09:18 EDT

## 2023-04-19 ENCOUNTER — PATIENT MESSAGE (OUTPATIENT)
Dept: FAMILY MEDICINE CLINIC | Facility: CLINIC | Age: 63
End: 2023-04-19
Payer: COMMERCIAL

## 2023-04-25 DIAGNOSIS — R73.03 PREDIABETES: ICD-10-CM

## 2023-04-26 RX ORDER — TIRZEPATIDE 7.5 MG/.5ML
INJECTION, SOLUTION SUBCUTANEOUS
Qty: 2 ML | OUTPATIENT
Start: 2023-04-26

## 2023-04-30 DIAGNOSIS — I48.19 PERSISTENT ATRIAL FIBRILLATION: ICD-10-CM

## 2023-04-30 DIAGNOSIS — E78.2 MIXED HYPERLIPIDEMIA: ICD-10-CM

## 2023-05-01 RX ORDER — ATORVASTATIN CALCIUM 20 MG/1
20 TABLET, FILM COATED ORAL NIGHTLY
Qty: 90 TABLET | Refills: 0 | Status: SHIPPED | OUTPATIENT
Start: 2023-05-01

## 2023-05-01 NOTE — TELEPHONE ENCOUNTER
Lab Results   Component Value Date    GLUCOSE 97 01/30/2023    BUN 23 01/30/2023    CREATININE 0.71 (L) 01/30/2023    EGFR 102.5 01/30/2023    BCR 32.4 (H) 01/30/2023    K 4.2 01/30/2023    CO2 29.0 01/30/2023    CALCIUM 9.6 01/30/2023    ALBUMIN 4.50 10/14/2022    BILITOT 0.7 10/14/2022    AST 39 10/14/2022    ALT 67 (H) 10/14/2022     Lab Results   Component Value Date    CHOL 205 (H) 10/14/2022    TRIG 128 10/14/2022    HDL 46 10/14/2022     (H) 10/14/2022

## 2023-05-18 DIAGNOSIS — R73.03 PREDIABETES: ICD-10-CM

## 2023-05-18 RX ORDER — TIRZEPATIDE 10 MG/.5ML
INJECTION, SOLUTION SUBCUTANEOUS
Qty: 2 ML | Refills: 0 | Status: SHIPPED | OUTPATIENT
Start: 2023-05-18

## 2023-05-18 NOTE — TELEPHONE ENCOUNTER
Rx Refill Note  Requested Prescriptions     Pending Prescriptions Disp Refills   • Mounjaro 10 MG/0.5ML solution pen-injector [Pharmacy Med Name: MOUNJARO 10 MG/0.5 ML PEN] 2 mL 0     Sig: INJECT 10 MG UNDER THE SKIN ONCE WEEKLY      Last office visit with prescribing clinician: 11/10/2022   Last telemedicine visit with prescribing clinician: 5/9/2023   Next office visit with prescribing clinician: Visit date not found                         Would you like a call back once the refill request has been completed: [] Yes [] No    If the office needs to give you a call back, can they leave a voicemail: [] Yes [] No    Agatha Carwford MA  05/18/23, 08:41 EDT

## 2023-05-24 ENCOUNTER — PATIENT MESSAGE (OUTPATIENT)
Dept: FAMILY MEDICINE CLINIC | Facility: CLINIC | Age: 63
End: 2023-05-24
Payer: COMMERCIAL

## 2023-05-31 DIAGNOSIS — I48.19 PERSISTENT ATRIAL FIBRILLATION: ICD-10-CM

## 2023-05-31 DIAGNOSIS — E78.2 MIXED HYPERLIPIDEMIA: ICD-10-CM

## 2023-05-31 DIAGNOSIS — I10 PRIMARY HYPERTENSION: ICD-10-CM

## 2023-05-31 DIAGNOSIS — R73.03 PREDIABETES: ICD-10-CM

## 2023-05-31 RX ORDER — HYDROCHLOROTHIAZIDE 25 MG/1
25 TABLET ORAL EVERY MORNING
Qty: 90 TABLET | Refills: 1 | Status: SHIPPED | OUTPATIENT
Start: 2023-05-31

## 2023-05-31 RX ORDER — TIRZEPATIDE 10 MG/.5ML
INJECTION, SOLUTION SUBCUTANEOUS
Qty: 2 ML | Refills: 0 | OUTPATIENT
Start: 2023-05-31

## 2023-05-31 RX ORDER — ATORVASTATIN CALCIUM 20 MG/1
20 TABLET, FILM COATED ORAL NIGHTLY
Qty: 90 TABLET | Refills: 0 | Status: SHIPPED | OUTPATIENT
Start: 2023-05-31

## 2023-07-10 PROBLEM — M16.11 ARTHRITIS OF RIGHT HIP: Status: ACTIVE | Noted: 2023-07-10

## 2023-07-10 PROBLEM — Z96.641 STATUS POST TOTAL HIP REPLACEMENT, RIGHT: Status: ACTIVE | Noted: 2023-07-10

## 2023-07-11 PROBLEM — Z79.01 CHRONIC ANTICOAGULATION: Status: ACTIVE | Noted: 2023-07-11

## 2023-07-24 DIAGNOSIS — R73.03 PREDIABETES: ICD-10-CM

## 2023-07-24 DIAGNOSIS — I10 PRIMARY HYPERTENSION: ICD-10-CM

## 2023-07-24 RX ORDER — TIRZEPATIDE 10 MG/.5ML
10 INJECTION, SOLUTION SUBCUTANEOUS
Qty: 2 ML | Refills: 0 | Status: CANCELLED | OUTPATIENT
Start: 2023-07-24

## 2023-07-24 RX ORDER — HYDROCHLOROTHIAZIDE 25 MG/1
25 TABLET ORAL EVERY MORNING
Qty: 90 TABLET | Refills: 1 | Status: CANCELLED | OUTPATIENT
Start: 2023-07-24

## 2023-08-21 DIAGNOSIS — R73.03 PREDIABETES: Primary | ICD-10-CM

## 2023-08-21 RX ORDER — TIRZEPATIDE 12.5 MG/.5ML
12.5 INJECTION, SOLUTION SUBCUTANEOUS
Qty: 2 ML | Refills: 1 | Status: SHIPPED | OUTPATIENT
Start: 2023-08-21

## 2023-08-23 DIAGNOSIS — R73.03 PREDIABETES: ICD-10-CM

## 2023-08-24 RX ORDER — TIRZEPATIDE 12.5 MG/.5ML
12.5 INJECTION, SOLUTION SUBCUTANEOUS
Qty: 2 ML | Refills: 1 | OUTPATIENT
Start: 2023-08-24

## 2023-09-09 DIAGNOSIS — R73.03 PREDIABETES: ICD-10-CM

## 2023-09-09 DIAGNOSIS — E78.2 MIXED HYPERLIPIDEMIA: ICD-10-CM

## 2023-09-11 RX ORDER — ATORVASTATIN CALCIUM 20 MG/1
20 TABLET, FILM COATED ORAL NIGHTLY
Qty: 30 TABLET | Refills: 3 | OUTPATIENT
Start: 2023-09-11

## 2023-09-11 RX ORDER — TIRZEPATIDE 12.5 MG/.5ML
12.5 INJECTION, SOLUTION SUBCUTANEOUS
Qty: 6 ML | Refills: 0 | Status: SHIPPED | OUTPATIENT
Start: 2023-09-11 | End: 2023-09-17 | Stop reason: SDUPTHER

## 2023-09-11 NOTE — TELEPHONE ENCOUNTER
Rx Refill Note  Requested Prescriptions     Pending Prescriptions Disp Refills    Tirzepatide (Mounjaro) 12.5 MG/0.5ML solution pen-injector 2 mL 1     Sig: Inject 0.5 mL under the skin into the appropriate area as directed Every 7 (Seven) Days.      Last office visit with prescribing clinician: 7/21/2023   Last telemedicine visit with prescribing clinician: Visit date not found   Next office visit with prescribing clinician: Visit date not found                         Would you like a call back once the refill request has been completed: [] Yes [] No    If the office needs to give you a call back, can they leave a voicemail: [] Yes [] No    April TARAS eGe  09/11/23, 09:19 EDT

## 2023-09-17 DIAGNOSIS — R73.03 PREDIABETES: ICD-10-CM

## 2023-09-18 RX ORDER — TIRZEPATIDE 12.5 MG/.5ML
12.5 INJECTION, SOLUTION SUBCUTANEOUS
Qty: 6 ML | Refills: 0 | Status: SHIPPED | OUTPATIENT
Start: 2023-09-18

## 2023-09-18 NOTE — TELEPHONE ENCOUNTER
Rx Refill Note  Requested Prescriptions     Pending Prescriptions Disp Refills    Tirzepatide (Mounjaro) 12.5 MG/0.5ML solution pen-injector 6 mL 0     Sig: Inject 0.5 mL under the skin into the appropriate area as directed Every 7 (Seven) Days.      Last office visit with prescribing clinician: 7/21/2023     Next office visit with prescribing clinician:Return in about 4 months (around 11/21/2023) for Annual physical.       Francine Nava MA  09/18/23, 09:32 EDT

## 2023-10-11 DIAGNOSIS — E78.2 MIXED HYPERLIPIDEMIA: ICD-10-CM

## 2023-10-11 DIAGNOSIS — I10 PRIMARY HYPERTENSION: ICD-10-CM

## 2023-10-11 RX ORDER — HYDROCHLOROTHIAZIDE 25 MG/1
25 TABLET ORAL EVERY MORNING
Qty: 90 TABLET | Refills: 1 | Status: SHIPPED | OUTPATIENT
Start: 2023-10-11 | End: 2023-10-12 | Stop reason: SDUPTHER

## 2023-10-11 RX ORDER — ATORVASTATIN CALCIUM 20 MG/1
20 TABLET, FILM COATED ORAL NIGHTLY
Qty: 90 TABLET | Refills: 3 | Status: SHIPPED | OUTPATIENT
Start: 2023-10-11

## 2023-10-11 NOTE — TELEPHONE ENCOUNTER
Lab Results   Component Value Date    CHOL 205 (H) 10/14/2022    TRIG 128 10/14/2022    HDL 46 10/14/2022     (H) 10/14/2022      Lab Results   Component Value Date    GLUCOSE 120 (H) 07/11/2023    BUN 12 07/11/2023    CREATININE 0.70 (L) 07/11/2023    EGFR 102.3 07/11/2023    BCR 17.1 07/11/2023    K 4.6 07/11/2023    CO2 27.0 07/11/2023    CALCIUM 9.0 07/11/2023    ALBUMIN 4.4 06/26/2023    BILITOT 0.7 06/26/2023    AST 19 06/26/2023    ALT 28 06/26/2023

## 2023-10-12 DIAGNOSIS — E78.2 MIXED HYPERLIPIDEMIA: ICD-10-CM

## 2023-10-12 DIAGNOSIS — R73.03 PREDIABETES: ICD-10-CM

## 2023-10-12 DIAGNOSIS — I10 PRIMARY HYPERTENSION: ICD-10-CM

## 2023-10-13 RX ORDER — HYDROCHLOROTHIAZIDE 25 MG/1
25 TABLET ORAL EVERY MORNING
Qty: 90 TABLET | Refills: 1 | Status: SHIPPED | OUTPATIENT
Start: 2023-10-13

## 2023-10-13 RX ORDER — TIRZEPATIDE 12.5 MG/.5ML
12.5 INJECTION, SOLUTION SUBCUTANEOUS
Qty: 6 ML | Refills: 0 | Status: SHIPPED | OUTPATIENT
Start: 2023-10-13

## 2023-10-13 RX ORDER — ATORVASTATIN CALCIUM 20 MG/1
20 TABLET, FILM COATED ORAL NIGHTLY
Qty: 90 TABLET | Refills: 3 | OUTPATIENT
Start: 2023-10-13

## 2023-10-13 RX ORDER — DILTIAZEM HYDROCHLORIDE 240 MG/1
240 CAPSULE, EXTENDED RELEASE ORAL DAILY
Qty: 90 CAPSULE | Refills: 0 | Status: SHIPPED | OUTPATIENT
Start: 2023-10-13

## 2023-10-13 NOTE — TELEPHONE ENCOUNTER
Rx Refill Note  Requested Prescriptions     Pending Prescriptions Disp Refills    dilTIAZem (TIAZAC) 240 MG 24 hr capsule       Sig: Take 1 capsule by mouth Daily.    Tirzepatide (Mounjaro) 12.5 MG/0.5ML solution pen-injector 6 mL 0     Sig: Inject 0.5 mL under the skin into the appropriate area as directed Every 7 (Seven) Days.    hydroCHLOROthiazide (HYDRODIURIL) 25 MG tablet 90 tablet 1     Sig: Take 1 tablet by mouth Every Morning.      Last office visit with prescribing clinician: 7/21/2023   Last telemedicine visit with prescribing clinician: Visit date not found   Next office visit with prescribing clinician: Visit date not found                         Would you like a call back once the refill request has been completed: [] Yes [] No    If the office needs to give you a call back, can they leave a voicemail: [] Yes [] No    Agatha Crawford MA  10/13/23, 09:51 EDT

## 2023-11-05 DIAGNOSIS — I10 PRIMARY HYPERTENSION: ICD-10-CM

## 2023-11-05 DIAGNOSIS — R73.03 PREDIABETES: ICD-10-CM

## 2023-11-06 NOTE — TELEPHONE ENCOUNTER
Rx Refill Note  Requested Prescriptions     Pending Prescriptions Disp Refills    hydroCHLOROthiazide (HYDRODIURIL) 25 MG tablet 90 tablet 1     Sig: Take 1 tablet by mouth Every Morning.      Last office visit with prescribing clinician: 7/21/2023     Next office visit with prescribing clinician: Return in about 4 months (around 11/21/2023) for Annual physical.        Francine Nava MA  11/06/23, 14:32 EST

## 2023-11-07 RX ORDER — HYDROCHLOROTHIAZIDE 25 MG/1
25 TABLET ORAL EVERY MORNING
Qty: 90 TABLET | Refills: 1 | Status: SHIPPED | OUTPATIENT
Start: 2023-11-07

## 2023-11-07 RX ORDER — TIRZEPATIDE 12.5 MG/.5ML
12.5 INJECTION, SOLUTION SUBCUTANEOUS
Qty: 6 ML | Refills: 0 | Status: SHIPPED | OUTPATIENT
Start: 2023-11-07

## 2023-11-07 NOTE — TELEPHONE ENCOUNTER
Rx Refill Note  Requested Prescriptions     Pending Prescriptions Disp Refills    Tirzepatide (Mounjaro) 12.5 MG/0.5ML solution pen-injector 6 mL 0     Sig: Inject 0.5 mL under the skin into the appropriate area as directed Every 7 (Seven) Days.      Last office visit with prescribing clinician: 7/21/2023     Next office visit with prescribing clinician:   Francine Nava MA  11/07/23, 18:49 EST

## 2023-11-28 DIAGNOSIS — I10 PRIMARY HYPERTENSION: ICD-10-CM

## 2023-11-28 DIAGNOSIS — R73.03 PREDIABETES: ICD-10-CM

## 2023-11-28 RX ORDER — TIRZEPATIDE 12.5 MG/.5ML
12.5 INJECTION, SOLUTION SUBCUTANEOUS
Qty: 2 ML | Refills: 0 | Status: SHIPPED | OUTPATIENT
Start: 2023-11-28

## 2023-11-28 RX ORDER — HYDROCHLOROTHIAZIDE 25 MG/1
25 TABLET ORAL EVERY MORNING
Qty: 30 TABLET | Refills: 0 | Status: SHIPPED | OUTPATIENT
Start: 2023-11-28

## 2023-11-28 NOTE — TELEPHONE ENCOUNTER
Please let patient know that I will send in a 30 day supply of medication.  I have not seen him since July and will not be able to refill any further.  Needs APE appointment prior to any more refills.

## 2023-11-28 NOTE — TELEPHONE ENCOUNTER
Rx Refill Note  Requested Prescriptions     Pending Prescriptions Disp Refills    hydroCHLOROthiazide (HYDRODIURIL) 25 MG tablet 90 tablet 1     Sig: Take 1 tablet by mouth Every Morning.    Tirzepatide (Mounjaro) 12.5 MG/0.5ML solution pen-injector 6 mL 0     Sig: Inject 0.5 mL under the skin into the appropriate area as directed Every 7 (Seven) Days.      Last office visit with prescribing clinician: 7/21/2023   Last telemedicine visit with prescribing clinician: Visit date not found   Next office visit with prescribing clinician: Visit date not found                         Would you like a call back once the refill request has been completed: [] Yes [] No    If the office needs to give you a call back, can they leave a voicemail: [] Yes [] No    Agatha Crawford MA  11/28/23, 12:49 EST

## 2023-12-18 DIAGNOSIS — R73.03 PREDIABETES: ICD-10-CM

## 2023-12-18 RX ORDER — HYDROCHLOROTHIAZIDE 25 MG/1
25 TABLET ORAL DAILY
COMMUNITY

## 2023-12-18 RX ORDER — DILTIAZEM HYDROCHLORIDE 240 MG/1
240 CAPSULE, COATED, EXTENDED RELEASE ORAL DAILY
COMMUNITY

## 2024-01-03 DIAGNOSIS — E78.2 MIXED HYPERLIPIDEMIA: ICD-10-CM

## 2024-01-03 DIAGNOSIS — R73.03 PREDIABETES: ICD-10-CM

## 2024-01-03 RX ORDER — ATORVASTATIN CALCIUM 20 MG/1
20 TABLET, FILM COATED ORAL NIGHTLY
Qty: 90 TABLET | Refills: 3 | OUTPATIENT
Start: 2024-01-03

## 2024-01-03 NOTE — TELEPHONE ENCOUNTER
Rx Refill Note  Requested Prescriptions     Pending Prescriptions Disp Refills    Tirzepatide (Mounjaro) 12.5 MG/0.5ML solution pen-injector pen 6 mL 0     Sig: Inject 0.5 mL under the skin into the appropriate area as directed Every 7 (Seven) Days.      Last office visit with prescribing clinician: 7/21/2023   Last telemedicine visit with prescribing clinician: Visit date not found   Next office visit with prescribing clinician: Visit date not found                         Would you like a call back once the refill request has been completed: [] Yes [] No    If the office needs to give you a call back, can they leave a voicemail: [] Yes [] No    Johnny Rivers MA  01/03/24, 14:33 EST

## 2024-01-08 DIAGNOSIS — R73.03 PREDIABETES: ICD-10-CM

## 2024-01-09 NOTE — TELEPHONE ENCOUNTER
Rx Refill Note  Requested Prescriptions     Pending Prescriptions Disp Refills    Tirzepatide (Mounjaro) 12.5 MG/0.5ML solution pen-injector pen 6 mL 0     Sig: Inject 0.5 mL under the skin into the appropriate area as directed Every 7 (Seven) Days.      Last office visit with prescribing clinician: 7/21/2023   Last telemedicine visit with prescribing clinician: Visit date not found   Next office visit with prescribing clinician: Visit date not found                         Would you like a call back once the refill request has been completed: [] Yes [] No    If the office needs to give you a call back, can they leave a voicemail: [] Yes [] No    Agatha Crawford MA  01/09/24, 07:50 EST

## 2024-01-16 DIAGNOSIS — R73.03 PREDIABETES: ICD-10-CM

## 2024-01-17 NOTE — TELEPHONE ENCOUNTER
Rx Refill Note  Requested Prescriptions     Pending Prescriptions Disp Refills    Tirzepatide (Mounjaro) 12.5 MG/0.5ML solution pen-injector pen 6 mL 0     Sig: Inject 0.5 mL under the skin into the appropriate area as directed Every 7 (Seven) Days.      Last office visit with prescribing clinician: 7/21/2023   Last telemedicine visit with prescribing clinician: Visit date not found   Next office visit with prescribing clinician: Visit date not found                         Would you like a call back once the refill request has been completed: [] Yes [] No    If the office needs to give you a call back, can they leave a voicemail: [] Yes [] No    Agatha Crawford MA  01/17/24, 07:55 EST